# Patient Record
Sex: FEMALE | Race: WHITE
[De-identification: names, ages, dates, MRNs, and addresses within clinical notes are randomized per-mention and may not be internally consistent; named-entity substitution may affect disease eponyms.]

---

## 2019-03-28 ENCOUNTER — HOSPITAL ENCOUNTER (INPATIENT)
Dept: HOSPITAL 10 - E/R | Age: 59
LOS: 3 days | DRG: 871 | End: 2019-03-31
Attending: INTERNAL MEDICINE | Admitting: INTERNAL MEDICINE
Payer: COMMERCIAL

## 2019-03-28 ENCOUNTER — HOSPITAL ENCOUNTER (INPATIENT)
Dept: HOSPITAL 91 - 6WM | Age: 59
LOS: 3 days | DRG: 871 | End: 2019-03-31
Payer: COMMERCIAL

## 2019-03-28 VITALS
HEIGHT: 60 IN | BODY MASS INDEX: 57.52 KG/M2 | WEIGHT: 293 LBS | HEIGHT: 60 IN | BODY MASS INDEX: 57.52 KG/M2 | WEIGHT: 293 LBS

## 2019-03-28 DIAGNOSIS — Z66: ICD-10-CM

## 2019-03-28 DIAGNOSIS — K74.60: ICD-10-CM

## 2019-03-28 DIAGNOSIS — N18.6: ICD-10-CM

## 2019-03-28 DIAGNOSIS — B37.49: ICD-10-CM

## 2019-03-28 DIAGNOSIS — I13.2: ICD-10-CM

## 2019-03-28 DIAGNOSIS — A41.9: Primary | ICD-10-CM

## 2019-03-28 DIAGNOSIS — J69.0: ICD-10-CM

## 2019-03-28 DIAGNOSIS — D61.818: ICD-10-CM

## 2019-03-28 DIAGNOSIS — G47.33: ICD-10-CM

## 2019-03-28 DIAGNOSIS — N17.9: ICD-10-CM

## 2019-03-28 DIAGNOSIS — E78.2: ICD-10-CM

## 2019-03-28 DIAGNOSIS — K72.90: ICD-10-CM

## 2019-03-28 DIAGNOSIS — N25.81: ICD-10-CM

## 2019-03-28 DIAGNOSIS — I73.9: ICD-10-CM

## 2019-03-28 DIAGNOSIS — D68.4: ICD-10-CM

## 2019-03-28 DIAGNOSIS — I50.33: ICD-10-CM

## 2019-03-28 DIAGNOSIS — E87.2: ICD-10-CM

## 2019-03-28 DIAGNOSIS — K75.81: ICD-10-CM

## 2019-03-28 DIAGNOSIS — Z89.612: ICD-10-CM

## 2019-03-28 DIAGNOSIS — E66.01: ICD-10-CM

## 2019-03-28 LAB
ABNORMAL IP MESSAGE: 1
ADD MAN DIFF?: NO
ADD UMIC: YES
ALANINE AMINOTRANSFERASE: 54 IU/L (ref 13–69)
ALBUMIN/GLOBULIN RATIO: 0.7
ALBUMIN: 2.2 G/DL (ref 3.3–4.9)
ALKALINE PHOSPHATASE: 223 IU/L (ref 42–121)
ANION GAP: 16 (ref 5–13)
ASPARTATE AMINO TRANSFERASE: 61 IU/L (ref 15–46)
BASOPHIL #: 0 10^3/UL (ref 0–0.1)
BASOPHILS %: 0.3 % (ref 0–2)
BILIRUBIN,DIRECT: 0.9 MG/DL (ref 0–0.2)
BILIRUBIN,TOTAL: 2.7 MG/DL (ref 0.2–1.3)
BLOOD UREA NITROGEN: 128 MG/DL (ref 7–20)
CALCIUM: 7.9 MG/DL (ref 8.4–10.2)
CARBON DIOXIDE: 25 MMOL/L (ref 21–31)
CHLORIDE: 100 MMOL/L (ref 97–110)
CREATININE: 5.02 MG/DL (ref 0.44–1)
EOSINOPHILS #: 0.1 10^3/UL (ref 0–0.5)
EOSINOPHILS %: 0.7 % (ref 0–7)
GLOBULIN: 3.1 G/DL (ref 1.3–3.2)
GLUCOSE: 96 MG/DL (ref 70–220)
HEMATOCRIT: 22 % (ref 37–47)
HEMOGLOBIN: 7 G/DL (ref 12–16)
IMMATURE GRANS #M: 0.08 10^3/UL (ref 0–0.03)
IMMATURE GRANS % (M): 1.2 % (ref 0–0.43)
INR: 1.53
LIPASE: 43 U/L (ref 23–300)
LYMPHOCYTES #: 1 10^3/UL (ref 0.8–2.9)
LYMPHOCYTES %: 14.2 % (ref 15–51)
MEAN CORPUSCULAR HEMOGLOBIN: 31.8 PG (ref 29–33)
MEAN CORPUSCULAR HGB CONC: 31.8 G/DL (ref 32–37)
MEAN CORPUSCULAR VOLUME: 100 FL (ref 82–101)
MEAN PLATELET VOLUME: 10.7 FL (ref 7.4–10.4)
MONOCYTE #: 0.2 10^3/UL (ref 0.3–0.9)
MONOCYTES %: 2.4 % (ref 0–11)
NEUTROPHIL #: 5.5 10^3/UL (ref 1.6–7.5)
NEUTROPHILS %: 81.2 % (ref 39–77)
NUCLEATED RED BLOOD CELLS #: 0 10^3/UL (ref 0–0)
NUCLEATED RED BLOOD CELLS%: 0.4 /100WBC (ref 0–0)
PARTIAL THROMBOPLASTIN TIME: 46.8 SEC (ref 23–35)
PLATELET COUNT: 73 10^3/UL (ref 140–415)
POSITIVE DIFF: (no result)
POTASSIUM: 4.5 MMOL/L (ref 3.5–5.1)
PROTIME: 18.5 SEC (ref 11.9–14.9)
PT RATIO: 1.4
RED BLOOD COUNT: 2.2 10^6/UL (ref 4.2–5.4)
RED CELL DISTRIBUTION WIDTH: 20.6 % (ref 11.5–14.5)
SODIUM: 141 MMOL/L (ref 135–144)
TOTAL PROTEIN: 5.3 G/DL (ref 6.1–8.1)
TROPONIN-I: < 0.012 NG/ML (ref 0–0.12)
UR ASCORBIC ACID: NEGATIVE MG/DL
UR BACTERIA: (no result) /HPF
UR BILIRUBIN (DIP): NEGATIVE MG/DL
UR BLOOD (DIP): (no result) MG/DL
UR BUDDING YEAST: (no result) /HPF
UR CLARITY: (no result)
UR COLOR: (no result)
UR GLUCOSE (DIP): NEGATIVE MG/DL
UR KETONES (DIP): NEGATIVE MG/DL
UR LEUKOCYTE ESTERASE (DIP): (no result) LEU/UL
UR MUCUS: (no result) /HPF
UR NITRITE (DIP): NEGATIVE MG/DL
UR PH (DIP): 5 (ref 5–9)
UR RBC: 22 /HPF (ref 0–5)
UR SPECIFIC GRAVITY (DIP): 1.01 (ref 1–1.03)
UR TOTAL PROTEIN (DIP): (no result) MG/DL
UR UROBILINOGEN (DIP): NEGATIVE MG/DL
UR WBC: > 182 /HPF (ref 0–5)
WHITE BLOOD COUNT: 6.8 10^3/UL (ref 4.8–10.8)

## 2019-03-28 PROCEDURE — 83735 ASSAY OF MAGNESIUM: CPT

## 2019-03-28 PROCEDURE — 86900 BLOOD TYPING SEROLOGIC ABO: CPT

## 2019-03-28 PROCEDURE — 94664 DEMO&/EVAL PT USE INHALER: CPT

## 2019-03-28 PROCEDURE — 86901 BLOOD TYPING SEROLOGIC RH(D): CPT

## 2019-03-28 PROCEDURE — 82436 ASSAY OF URINE CHLORIDE: CPT

## 2019-03-28 PROCEDURE — 81001 URINALYSIS AUTO W/SCOPE: CPT

## 2019-03-28 PROCEDURE — C1769 GUIDE WIRE: HCPCS

## 2019-03-28 PROCEDURE — 86945 BLOOD PRODUCT/IRRADIATION: CPT

## 2019-03-28 PROCEDURE — 76775 US EXAM ABDO BACK WALL LIM: CPT

## 2019-03-28 PROCEDURE — 71250 CT THORAX DX C-: CPT

## 2019-03-28 PROCEDURE — 82962 GLUCOSE BLOOD TEST: CPT

## 2019-03-28 PROCEDURE — 82803 BLOOD GASES ANY COMBINATION: CPT

## 2019-03-28 PROCEDURE — 87340 HEPATITIS B SURFACE AG IA: CPT

## 2019-03-28 PROCEDURE — 83036 HEMOGLOBIN GLYCOSYLATED A1C: CPT

## 2019-03-28 PROCEDURE — 82607 VITAMIN B-12: CPT

## 2019-03-28 PROCEDURE — 83605 ASSAY OF LACTIC ACID: CPT

## 2019-03-28 PROCEDURE — 84443 ASSAY THYROID STIM HORMONE: CPT

## 2019-03-28 PROCEDURE — 83690 ASSAY OF LIPASE: CPT

## 2019-03-28 PROCEDURE — 86706 HEP B SURFACE ANTIBODY: CPT

## 2019-03-28 PROCEDURE — 90686 IIV4 VACC NO PRSV 0.5 ML IM: CPT

## 2019-03-28 PROCEDURE — 90935 HEMODIALYSIS ONE EVALUATION: CPT

## 2019-03-28 PROCEDURE — 84155 ASSAY OF PROTEIN SERUM: CPT

## 2019-03-28 PROCEDURE — 70450 CT HEAD/BRAIN W/O DYE: CPT

## 2019-03-28 PROCEDURE — 36430 TRANSFUSION BLD/BLD COMPNT: CPT

## 2019-03-28 PROCEDURE — 84300 ASSAY OF URINE SODIUM: CPT

## 2019-03-28 PROCEDURE — 80053 COMPREHEN METABOLIC PANEL: CPT

## 2019-03-28 PROCEDURE — C9113 INJ PANTOPRAZOLE SODIUM, VIA: HCPCS

## 2019-03-28 PROCEDURE — 36569 INSJ PICC 5 YR+ W/O IMAGING: CPT

## 2019-03-28 PROCEDURE — 99291 CRITICAL CARE FIRST HOUR: CPT

## 2019-03-28 PROCEDURE — 87081 CULTURE SCREEN ONLY: CPT

## 2019-03-28 PROCEDURE — 83540 ASSAY OF IRON: CPT

## 2019-03-28 PROCEDURE — P9016 RBC LEUKOCYTES REDUCED: HCPCS

## 2019-03-28 PROCEDURE — 85384 FIBRINOGEN ACTIVITY: CPT

## 2019-03-28 PROCEDURE — 86850 RBC ANTIBODY SCREEN: CPT

## 2019-03-28 PROCEDURE — 74176 CT ABD & PELVIS W/O CONTRAST: CPT

## 2019-03-28 PROCEDURE — 85730 THROMBOPLASTIN TIME PARTIAL: CPT

## 2019-03-28 PROCEDURE — 80061 LIPID PANEL: CPT

## 2019-03-28 PROCEDURE — 82728 ASSAY OF FERRITIN: CPT

## 2019-03-28 PROCEDURE — 85378 FIBRIN DEGRADE SEMIQUANT: CPT

## 2019-03-28 PROCEDURE — 87040 BLOOD CULTURE FOR BACTERIA: CPT

## 2019-03-28 PROCEDURE — 71045 X-RAY EXAM CHEST 1 VIEW: CPT

## 2019-03-28 PROCEDURE — 85362 FIBRIN DEGRADATION PRODUCTS: CPT

## 2019-03-28 PROCEDURE — 85670 THROMBIN TIME PLASMA: CPT

## 2019-03-28 PROCEDURE — 82140 ASSAY OF AMMONIA: CPT

## 2019-03-28 PROCEDURE — 86920 COMPATIBILITY TEST SPIN: CPT

## 2019-03-28 PROCEDURE — 82043 UR ALBUMIN QUANTITATIVE: CPT

## 2019-03-28 PROCEDURE — 85610 PROTHROMBIN TIME: CPT

## 2019-03-28 PROCEDURE — 81003 URINALYSIS AUTO W/O SCOPE: CPT

## 2019-03-28 PROCEDURE — 76705 ECHO EXAM OF ABDOMEN: CPT

## 2019-03-28 PROCEDURE — 84484 ASSAY OF TROPONIN QUANT: CPT

## 2019-03-28 PROCEDURE — 85049 AUTOMATED PLATELET COUNT: CPT

## 2019-03-28 PROCEDURE — C1752 CATH,HEMODIALYSIS,SHORT-TERM: HCPCS

## 2019-03-28 PROCEDURE — P9059 PLASMA, FRZ BETWEEN 8-24HOUR: HCPCS

## 2019-03-28 PROCEDURE — 86644 CMV ANTIBODY: CPT

## 2019-03-28 PROCEDURE — 82746 ASSAY OF FOLIC ACID SERUM: CPT

## 2019-03-28 PROCEDURE — 84133 ASSAY OF URINE POTASSIUM: CPT

## 2019-03-28 PROCEDURE — 85025 COMPLETE CBC W/AUTO DIFF WBC: CPT

## 2019-03-28 PROCEDURE — 36600 WITHDRAWAL OF ARTERIAL BLOOD: CPT

## 2019-03-28 PROCEDURE — P9047 ALBUMIN (HUMAN), 25%, 50ML: HCPCS

## 2019-03-28 PROCEDURE — 87086 URINE CULTURE/COLONY COUNT: CPT

## 2019-03-28 PROCEDURE — P9035 PLATELET PHERES LEUKOREDUCED: HCPCS

## 2019-03-28 RX ADMIN — FOLIC ACID SCH MLS/HR: 5 INJECTION, SOLUTION INTRAMUSCULAR; INTRAVENOUS; SUBCUTANEOUS at 23:48

## 2019-03-28 RX ADMIN — THIAMINE HYDROCHLORIDE 1 MLS/HR: 100 INJECTION, SOLUTION INTRAMUSCULAR; INTRAVENOUS at 23:48

## 2019-03-28 RX ADMIN — LIDOCAINE HYDROCHLORIDE 1 MLS/HR: 10 INJECTION, SOLUTION EPIDURAL; INFILTRATION; INTRACAUDAL; PERINEURAL at 23:09

## 2019-03-28 NOTE — ERD
ER Documentation


Chief Complaint


Chief Complaint





johann whitlock from [facility] for kidney failure as per pmd





HPI


Patient is a 58-year-old female with anemia, chronic kidney disease, and 


thrombocytopenia who presents for acute renal failure.  The patient was sent by 


a nurse practitioner at [facility] for acute on chronic kidney disease.


 Brought in by ambulance and came from a skilled nursing facility.  The patient 


has had a worsening creatinine of 5.84 which is usually 1.5.  The patient is not


on dialysis currently.  The patient is a DNR/DNI.  Please note the history and 


physical exam is limited secondary to patient's mental status.





ROS


All systems reviewed and are negative except as per history of present illness.





Allergies


Allergies:  


Coded Allergies:  


     No Known Allergy (Unverified , 3/28/19)





PMhx/Soc


Medical and Surgical Hx:  Unable to obtain


Hx Alcohol Use:  No


Hx Substance Use:  No


Hx Tobacco Use:  No


Smoking Status:  Never smoker





FmHx


Unable to obtain





Physical Exam


Vitals





Vital Signs


  Date      Temp  Pulse  Resp  B/P (MAP)   Pulse Ox  O2          O2 Flow    FiO2


Time                                                 Delivery    Rate


   3/28/19  98.7     89    19     145/131       100  Room Air


     22:46                          (136)


   3/28/19  98.7     72    19      141/65       100


     21:31                           (90)





Physical Exam


Const:   No acute distress


Head:   Atraumatic 


Eyes:    Normal Conjunctiva


ENT:    Normal External Ears, Nose and Mouth.


Neck:               Full range of motion. No meningismus.


Resp:   Clear to auscultation bilaterally


Cardio:   Regular rate and rhythm, no murmurs


Abd:    Soft, non tender, non distended. Normal bowel sounds


Skin:   No petechiae or rashes


Back:   No midline or flank tenderness


Ext:    No cyanosis, or edema


Neur:   Awake and alert


Psych:    Normal Mood and Affect


Result Diagram:  


3/28/19 2236                                                                    


           3/28/19 2236





Results 24 hrs





Laboratory Tests


      Test
                                  3/28/19
22:35   3/28/19
22:36


      Urine Color                          GINGER


      Urine Clarity                        CLOUDY


      Urine pH                                        5.0


      Urine Specific Gravity                        1.015


      Urine Ketones                        NEGATIVE mg/dL


      Urine Nitrite                        NEGATIVE mg/dL


      Urine Bilirubin                      NEGATIVE mg/dL


      Urine Urobilinogen                   NEGATIVE mg/dL


      Urine Leukocyte Esterase                  3+ Torsten/ul


      Urine Microscopic RBC                       22 /HPF


      Urine Microscopic WBC                > 182 /HPF


      Urine Bacteria                       FEW /HPF


      Urine Mucus                          FEW /HPF


      Urine Yeast (Budding)                MANY /HPF


      Urine Hemoglobin                           2+ mg/dL


      Urine Glucose                        NEGATIVE mg/dL


      Urine Total Protein                        1+ mg/dl


      White Blood Count                                       6.8 10^3/ul


      Red Blood Count                                        2.20 10^6/ul


      Hemoglobin                                                 7.0 g/dl


      Hematocrit                                                   22.0 %


      Mean Corpuscular Volume                                    100.0 fl


      Mean Corpuscular Hemoglobin                                 31.8 pg


      Mean Corpuscular Hemoglobin
Concent  
                   31.8 g/dl 



      Red Cell Distribution Width                                  20.6 %


      Platelet Count                                           73 10^3/UL


      Mean Platelet Volume                                        10.7 fl


      Immature Granulocytes %                                     1.200 %


      Neutrophils %                                                81.2 %


      Lymphocytes %                                                14.2 %


      Monocytes %                                                   2.4 %


      Eosinophils %                                                 0.7 %


      Basophils %                                                   0.3 %


      Nucleated Red Blood Cells %                             0.4 /100WBC


      Immature Granulocytes #                               0.080 10^3/ul


      Neutrophils #                                           5.5 10^3/ul


      Lymphocytes #                                           1.0 10^3/ul


      Monocytes #                                             0.2 10^3/ul


      Eosinophils #                                           0.1 10^3/ul


      Basophils #                                             0.0 10^3/ul


      Nucleated Red Blood Cells #                             0.0 10^3/ul


      Prothrombin Time                                           18.5 Sec


      Prothrombin Time Ratio                                          1.4


      INR International Normalized
Ratio   
                        1.53 



      Activated Partial
Thromboplast Time  
                    46.8 Sec 



      Sodium Level                                             141 mmol/L


      Potassium Level                                          4.5 mmol/L


      Chloride Level                                           100 mmol/L


      Carbon Dioxide Level                                      25 mmol/L


      Anion Gap                                                        16


      Blood Urea Nitrogen                                       128 mg/dl


      Creatinine                                               5.02 mg/dl


      Est Glomerular Filtrat Rate
mL/min   
                    9 mL/min 



      Glucose Level                                              96 mg/dl


      Calcium Level                                             7.9 mg/dl


      Total Bilirubin                                           2.7 mg/dl


      Direct Bilirubin                                         0.90 mg/dl


      Indirect Bilirubin                                        1.8 mg/dl


      Aspartate Amino Transf
(AST/SGOT)    
                     61 IU/L 



      Alanine Aminotransferase
(ALT/SGPT)  
                     54 IU/L 



      Alkaline Phosphatase                                       223 IU/L


      Troponin I                                            < 0.012 ng/ml


      Total Protein                                              5.3 g/dl


      Albumin                                                    2.2 g/dl


      Globulin                                                  3.10 g/dl


      Albumin/Globulin Ratio                                         0.70


      Lipase                                                       43 U/L





Current Medications


 Medications
   Dose
          Sig/Javier
       Start Time
   Status  Last


 (Trade)       Ordered        Route
 PRN     Stop Time              Admin
Dose


                              Reason                                Admin


 Ceftriaxone    50 ml @ 
      ONCE  ONCE
    3/28/19                



Sodium         100 mls/hr     IVPB
          23:30



                                             3/28/19 23:59


 Sodium         0 ml @ 0
      Q0M ONCE
      3/28/19       DC       



Chloride       mls/hr         IV
            23:09



                                             3/28/19 23:12


 Ondansetron    4 mg           BRIDGE ORDER   3/28/19                



HCl
  (Zofran                 PRN
 IV
       23:30



Inj)                          NAUSEA/VOMITI  3/29/19 23:29


                              NG


                650 mg         ER BRIDGE      3/28/19                



Acetaminophen                 PRN
 PO
       23:30




  (Tylenol                   .MILD PAIN     3/29/19 23:29


Tab)                          1-3 OR TEMP








Procedures/MDM


Patient is a 58-year-old female who presents with acute renal failure.  She also


was found to have anemia and cystitis.  At this point out sepsis as she has no 


sirs criteria.  The patient will be given ceftriaxone and cultures are pending. 


The patient will be given 2 units of packed red blood cells for hemoglobin of 


7.0 which was requested by the nurse practitioner.  The patient will be admitted


to the care of Dr. Mclain.  Prognosis is poor given her comorbidities.





Critical Care:


   Time:                                 35 minutes excluding all billable 


procedures.


   Treatments/Evaluations:      Close monitoring and treatment of unstable vital


signs, cardiorespiratory, and neurologic status, while maintaining tight balance


of fluid, respiratory, and cardiac interventions.





Departure


Diagnosis:  


   Primary Impression:  


   Cystitis


   Additional Impressions:  


   ARF (acute renal failure)


   Acute renal failure type:  unspecified  Qualified Codes:  N17.9 - Acute 


   kidney failure, unspecified


   Anemia


   Anemia type:  unspecified type  Qualified Codes:  D64.9 - Anemia, unspecified


Condition:  Serious











NENA BARRIENTOS MD                Mar 28, 2019 23:54

## 2019-03-28 NOTE — HP
Date/Time of Note


Date/Time of Note


DATE: 3/28/19 


TIME: 23:58





Assessment/Plan


VTE Prophylaxis


Pharmacological prophylaxis:  heparin





Lines/Catheters


IV Catheter Type (from Nrs):  Saline Lock





Assessment/Plan


Assessment/Plan





1.  Acute on CKD


-will hydrate


-Place a Quintanilla, urine electrolytes, renal ultrasound, nephrology consult





2.  Anemia, likely acute on chronic: Reportedly patient does have a history of 


anemia.  No reported GI bleed


-FOBT, ferritin/iron


-Need for Epogen per nephrology


-Consider GI consult, which can wait until the results of FOBT is available 


unless anemia acutely worsens.  





3.  Sepsis: Secondary to UTI and skin wounds


-IV antibiotic, IV fluids.  Follow-up culture results.  Wound care consult





4. Lactic acidosis: Trend lactate.  See #3





5.  Chronic encephalopathy: Supportive care





6.  ALMA DELIA: Supplemental oxygen, bronchodilators.  Positive pressure ventilation as


needed 





7.  Hypertension: BP currently was in acceptable range





8.  Morbid obesity with a BMI of 65





9.  Multiple skin wounds: Wound care consult.  IV antibiotic, which also 


addresses UTI


Result Diagram:  


3/28/19 2236                                                                    


           3/28/19 2236





Results 24hrs





Laboratory Tests


       Test
                                3/28/19
22:35  3/28/19
22:36


       Urine Color                          GINGER


       Urine Clarity                        CLOUDY  A


       Urine pH                                     5.0


       Urine Specific Gravity                     1.015


       Urine Ketones                        NEGATIVE


       Urine Nitrite                        NEGATIVE


       Urine Bilirubin                      NEGATIVE


       Urine Urobilinogen                   NEGATIVE


       Urine Leukocyte Esterase                     3+  H


       Urine Microscopic RBC                        22  H


       Urine Microscopic WBC                > 182  H


       Urine Bacteria                       FEW  A


       Urine Mucus                          FEW  A


       Urine Yeast (Budding)                MANY  A


       Urine Hemoglobin                             2+  H


       Urine Glucose                        NEGATIVE


       Urine Total Protein                          1+  H


       White Blood Count                                           6.8


       Red Blood Count                                           2.20  L


       Hemoglobin                                                 7.0  L


       Hematocrit                                                22.0  L


       Mean Corpuscular Volume                                   100.0


       Mean Corpuscular Hemoglobin                                31.8


       Mean Corpuscular Hemoglobin
Concent  
                   31.8  L



       Red Cell Distribution Width                               20.6  H


       Platelet Count                                              73  L


       Mean Platelet Volume                                      10.7  H


       Immature Granulocytes %                                  1.200  H


       Neutrophils %                                             81.2  H


       Lymphocytes %                                             14.2  L


       Monocytes %                                                 2.4


       Eosinophils %                                               0.7


       Basophils %                                                 0.3


       Nucleated Red Blood Cells %                                0.4  H


       Immature Granulocytes #                                  0.080  H


       Neutrophils #                                               5.5


       Lymphocytes #                                               1.0


       Monocytes #                                                0.2  L


       Eosinophils #                                               0.1


       Basophils #                                                 0.0


       Nucleated Red Blood Cells #                                 0.0


       Prothrombin Time                                          18.5  H


       Prothrombin Time Ratio                                      1.4


       INR International Normalized
Ratio   
                    1.53  



       Activated Partial
Thromboplast Time  
                   46.8  H



       Sodium Level                                                141


       Potassium Level                                             4.5


       Chloride Level                                              100


       Carbon Dioxide Level                                         25


       Anion Gap                                                   16  H


       Blood Urea Nitrogen                                        128  H


       Creatinine                                                5.02  H


       Est Glomerular Filtrat Rate
mL/min   
                      9  L



       Glucose Level                                                96


       Calcium Level                                              7.9  L


       Total Bilirubin                                            2.7  H


       Direct Bilirubin                                          0.90  H


       Indirect Bilirubin                                         1.8  H


       Aspartate Amino Transf
(AST/SGOT)    
                     61  H



       Alanine Aminotransferase
(ALT/SGPT)  
                      54  



       Alkaline Phosphatase                                       223  H


       Troponin I                                          < 0.012


       Total Protein                                              5.3  L


       Albumin                                                    2.2  L


       Globulin                                                   3.10


       Albumin/Globulin Ratio                                     0.70


       Lipase                                                       43








HPI/ROS


Admit Date/Time


Admit Date/Time








Hx of Present Illness





This is a 58-year-old morbidly obese female with encephalopathy, hypertension, 


ALMA DELIA, CKD, PVD, diastolic CHF, left AKA, multiple skin wounds, history of I&D and


bilateral lower extremity.  Patient was sent from [facility] for worsening 


kidney function and anemia.  Information is gathered from chart review and from 


the ER physician because of patient's mental status.  Reportedly, patient is 


A&Ox1 at baseline.


When presented to ER, vitals were stable.  Lab shows creatinine of 5, , 


lactic acid 2.7 which increased to 3.1, hemoglobin 7.  UA consistent with UTI.  


Chest x-ray shows atelectasis otherwise no acute findings.





PMH/Family/Social


Past Medical History


Medical History:  other (See HPI)


Medications





Current Medications


Ceftriaxone Sodium 50 ml @  100 mls/hr ONCE  ONCE IVPB ;  Start 3/28/19 at 


23:30;  Stop 3/28/19 at 23:59


Ondansetron HCl (Zofran Inj) 4 mg BRIDGE ORDER PRN IV NAUSEA/VOMITING;  Start 


3/28/19 at 23:30;  Stop 3/29/19 at 23:29


Acetaminophen (Tylenol Tab) 650 mg ER BRIDGE PRN PO .MILD PAIN 1-3 OR TEMP;  


Start 3/28/19 at 23:30;  Stop 3/29/19 at 23:29


Sodium Chloride 1,000 ml @  100 mls/hr Q10H IV ;  Start 3/28/19 at 23:48


IV Flush (NS 3 ml) 3 ml PER PROTOCOL IV ;  Start 3/29/19 at 00:00


Ondansetron HCl (Zofran Inj) 4 mg Q6H  PRN IV NAUSEA/VOMITING;  Start 3/29/19 at


00:00


Acetaminophen (Tylenol Tab) 650 mg Q6H  PRN PO .PAIN 1-3 OR TEMP;  Start 3/29/19


at 00:00


Heparin Sodium (Porcine) (Heparin  (5000 Units/1ml)) 5,000 unit Q12 SC ;  Start 


3/29/19 at 09:00


Albuterol/ Ipratropium (Duoneb) 3 ml Q2H RESP THERAPY  PRN HHN SHORTNESS OF 


BREATH;  Start 3/29/19 at 00:00


Coded Allergies:  


     No Known Allergy (Unverified , 3/28/19)





Past Surgical History


Past Surgical Hx:  other (See HPI)





Family History


Significant Family History:  other (Unknown)





Social History


Alcohol Use:  other (Unknown)


Smoking Status:  Unknown if ever smoked


Drug Use:  other (Unknown)





Exam/Review of Systems


Vital Signs


Vitals





Vital Signs


  Date      Temp  Pulse  Resp  B/P (MAP)   Pulse Ox  O2          O2 Flow    FiO2


Time                                                 Delivery    Rate


   3/28/19  98.7     89    19     145/131       100  Room Air


     22:46                          (136)








Exam


Constitutional:  other (No acute distress.  Patient is not fully oriented)


Eyes:  PERRL


Respiratory:  other (Decreased breath sounds at the base bilaterally)


Cardiovascular:  regular rate and rhythm


Gastrointestinal:  soft


Extremities:  normal pulses


Neurological:  other (Not fully oriented)











SRIDEVI YI MD                 Mar 28, 2019 23:58

## 2019-03-29 VITALS — DIASTOLIC BLOOD PRESSURE: 90 MMHG | RESPIRATION RATE: 11 BRPM | HEART RATE: 86 BPM | SYSTOLIC BLOOD PRESSURE: 131 MMHG

## 2019-03-29 VITALS — DIASTOLIC BLOOD PRESSURE: 48 MMHG | RESPIRATION RATE: 17 BRPM | HEART RATE: 87 BPM | SYSTOLIC BLOOD PRESSURE: 104 MMHG

## 2019-03-29 VITALS — DIASTOLIC BLOOD PRESSURE: 57 MMHG | HEART RATE: 92 BPM | SYSTOLIC BLOOD PRESSURE: 113 MMHG | RESPIRATION RATE: 18 BRPM

## 2019-03-29 VITALS — DIASTOLIC BLOOD PRESSURE: 61 MMHG | RESPIRATION RATE: 10 BRPM | HEART RATE: 89 BPM | SYSTOLIC BLOOD PRESSURE: 129 MMHG

## 2019-03-29 VITALS — DIASTOLIC BLOOD PRESSURE: 59 MMHG | RESPIRATION RATE: 17 BRPM | HEART RATE: 87 BPM | SYSTOLIC BLOOD PRESSURE: 105 MMHG

## 2019-03-29 VITALS — SYSTOLIC BLOOD PRESSURE: 97 MMHG | RESPIRATION RATE: 18 BRPM | DIASTOLIC BLOOD PRESSURE: 56 MMHG | HEART RATE: 73 BPM

## 2019-03-29 VITALS — RESPIRATION RATE: 18 BRPM | DIASTOLIC BLOOD PRESSURE: 55 MMHG | SYSTOLIC BLOOD PRESSURE: 113 MMHG | HEART RATE: 89 BPM

## 2019-03-29 VITALS — HEART RATE: 92 BPM

## 2019-03-29 VITALS — HEART RATE: 92 BPM | SYSTOLIC BLOOD PRESSURE: 131 MMHG | DIASTOLIC BLOOD PRESSURE: 82 MMHG

## 2019-03-29 VITALS — DIASTOLIC BLOOD PRESSURE: 102 MMHG | RESPIRATION RATE: 11 BRPM | HEART RATE: 86 BPM | SYSTOLIC BLOOD PRESSURE: 118 MMHG

## 2019-03-29 VITALS — HEART RATE: 98 BPM

## 2019-03-29 VITALS — HEART RATE: 81 BPM

## 2019-03-29 VITALS — HEART RATE: 90 BPM

## 2019-03-29 LAB
% IRON SATURATION: 51 % SAT (ref 22–52)
ABNORMAL IP MESSAGE: 1
ADD MAN DIFF?: NO
ALANINE AMINOTRANSFERASE: 53 IU/L (ref 13–69)
ALBUMIN/GLOBULIN RATIO: 0.8
ALBUMIN: 2.4 G/DL (ref 3.3–4.9)
ALKALINE PHOSPHATASE: 201 IU/L (ref 42–121)
ALLEN TEST: (no result)
AMMONIA: 92 UMOL/L (ref 9–30)
ANION GAP: 16 (ref 5–13)
ANISOCYTOSIS: (no result) (ref 0–0)
ARTERIAL BASE EXCESS: -0.4 MMOL/L (ref -3–3)
ARTERIAL BLOOD GAS OXYGEN SAT: 93.6 MMHG (ref 95–98)
ARTERIAL COHB: 0.1 % (ref 0–3)
ARTERIAL FRACTION OF OXYHGB: 93 % (ref 93–99)
ARTERIAL HCO3: 22.7 MMOL/L (ref 22–26)
ARTERIAL METHB: 0.5 % (ref 0–1.5)
ARTERIAL PCO2: 31.3 MMHG (ref 35–45)
ASPARTATE AMINO TRANSFERASE: 51 IU/L (ref 15–46)
BAND NEUTROPHILS #M: 0.8 10^3/UL (ref 0–0.6)
BAND NEUTROPHILS % (M): 12 % (ref 0–4)
BASOPHIL #: 0 10^3/UL (ref 0–0.1)
BASOPHILS %: 0.3 % (ref 0–2)
BILIRUBIN,DIRECT: 1 MG/DL (ref 0–0.2)
BILIRUBIN,TOTAL: 2.6 MG/DL (ref 0.2–1.3)
BLOOD UREA NITROGEN: 128 MG/DL (ref 7–20)
CALCIUM: 7.5 MG/DL (ref 8.4–10.2)
CARBON DIOXIDE: 23 MMOL/L (ref 21–31)
CHLORIDE: 101 MMOL/L (ref 97–110)
CHOL/HDL RATIO: 7.3 RATIO
CHOLESTEROL: 95 MG/DL (ref 100–200)
CREATININE: 5.25 MG/DL (ref 0.44–1)
D-DIMER: 3371.23 NG/ML (ref ?–460)
EOSINOPHILS #: 0.1 10^3/UL (ref 0–0.5)
EOSINOPHILS % (M): 4 % (ref 0–7)
EOSINOPHILS %: 2 % (ref 0–7)
FERRITIN: 415 NG/ML (ref 11.1–264)
FIBRIN SPLIT PRODUCT: (no result) UG/ML (ref ?–10)
FIBRINOGEN: 218 MG/DL (ref 207–461)
FIO2: 21 %
FOLATE: 10.1 NG/ML (ref 2.8–20)
GLOBULIN: 3 G/DL (ref 1.3–3.2)
GLUCOSE: 82 MG/DL (ref 70–220)
HDL CHOLESTEROL: 13 MG/DL (ref 37–92)
HEMATOCRIT: 26.3 % (ref 37–47)
HEMOGLOBIN A1C: 4.9 % (ref 0–5.9)
HEMOGLOBIN: 8.6 G/DL (ref 12–16)
HEPATITIS B SURFACE ANTIBODY: NEGATIVE
HEPATITIS B SURFACE ANTIGEN: NEGATIVE
IMMATURE GRANS #M: 0.13 10^3/UL (ref 0–0.03)
IMMATURE GRANS % (M): 1.9 % (ref 0–0.43)
IMMEDIATE SPIN CROSSMATCH: 1 3
INR: 1.7
IRON: 74 UG/DL (ref 35–150)
LACTIC ACID: 2.7 MMOL/L (ref 0.5–2)
LACTIC ACID: 2.8 MMOL/L (ref 0.5–2)
LACTIC ACID: 3.1 MMOL/L (ref 0.5–2)
LDL CHOLESTEROL,CALCULATED: 64 MG/DL
LYMPHOCYTES #: 1.1 10^3/UL (ref 0.8–2.9)
LYMPHOCYTES #M: 1.3 10^3/UL (ref 0.8–2.9)
LYMPHOCYTES % (M): 19 % (ref 15–51)
LYMPHOCYTES %: 15 % (ref 15–51)
MACROCYTOSIS: (no result) (ref 0–0)
MAGNESIUM: 2.2 MG/DL (ref 1.7–2.5)
MEAN CORPUSCULAR HEMOGLOBIN: 30.5 PG (ref 29–33)
MEAN CORPUSCULAR HGB CONC: 32.7 G/DL (ref 32–37)
MEAN CORPUSCULAR VOLUME: 93.3 FL (ref 82–101)
MEAN PLATELET VOLUME: 10.7 FL (ref 7.4–10.4)
METAMYELOCYTES #M: 0 10^3/UL (ref 0–0)
METAMYELOCYTES %M: 1 % (ref 0–0)
MODE: (no result)
MONOCYTE #: 0.2 10^3/UL (ref 0.3–0.9)
MONOCYTE #M: 0.2 10^3/UL (ref 0.3–0.9)
MONOCYTES % (M): 3 % (ref 0–11)
MONOCYTES %: 3.3 % (ref 0–11)
NEUTROPHIL #: 5.5 10^3/UL (ref 1.6–7.5)
NEUTROPHILS %: 77.5 % (ref 39–77)
NUCLEATED RED BLOOD CELLS #: 0 10^3/UL (ref 0–0)
NUCLEATED RED BLOOD CELLS%: 0.6 /100WBC (ref 0–0)
O2 A-A PPRESDIFF RESPIRATORY: 37.4 MMHG (ref 7–24)
PARTIAL THROMBOPLASTIN TIME: 45.1 SEC (ref 23–35)
PLATELET COUNT: 63 10^3/UL (ref 140–415)
PLATELET COUNT: 75 10^3/UL (ref 140–415)
PLATELET ESTIMATE: (no result)
POIKILOCYTOSIS: (no result) (ref 0–0)
POLYCHROMASIA: (no result) (ref 0–0)
POSITIVE DIFF: (no result)
POTASSIUM: 4.4 MMOL/L (ref 3.5–5.1)
PROTIME: 20.1 SEC (ref 11.9–14.9)
PT RATIO: 1.6
RED BLOOD COUNT: 2.82 10^6/UL (ref 4.2–5.4)
RED CELL DISTRIBUTION WIDTH: 21.2 % (ref 11.5–14.5)
SEG NEUT #M: 4.3 10^3/UL (ref 1.6–7.5)
SEGMENTED NEUTROPHILS (M) %: 60 % (ref 39–77)
SMUDGE%M: 6 % (ref 0–0)
SODIUM: 140 MMOL/L (ref 135–144)
THROMBIN TIME: 18.6 SEC (ref 13.8–19.1)
THYROID STIMULATING HORMONE: 2.29 MIU/L (ref 0.47–4.68)
TOTAL IRON BINDING CAPACITY: 144 UG/DL (ref 241–421)
TOTAL PROTEIN: 5.4 G/DL (ref 6.1–8.1)
TRIGLYCERIDES: 92 MG/DL (ref 0–149)
VITAMIN B12: > 1000 PG/ML (ref 239–931)
WHITE BLOOD COUNT: 7 10^3/UL (ref 4.8–10.8)

## 2019-03-29 PROCEDURE — 02HV33Z INSERTION OF INFUSION DEVICE INTO SUPERIOR VENA CAVA, PERCUTANEOUS APPROACH: ICD-10-PCS

## 2019-03-29 PROCEDURE — 30243R1 TRANSFUSION OF NONAUTOLOGOUS PLATELETS INTO CENTRAL VEIN, PERCUTANEOUS APPROACH: ICD-10-PCS

## 2019-03-29 PROCEDURE — 30243N1 TRANSFUSION OF NONAUTOLOGOUS RED BLOOD CELLS INTO CENTRAL VEIN, PERCUTANEOUS APPROACH: ICD-10-PCS

## 2019-03-29 PROCEDURE — 30243N1 TRANSFUSION OF NONAUTOLOGOUS RED BLOOD CELLS INTO CENTRAL VEIN, PERCUTANEOUS APPROACH: ICD-10-PCS | Performed by: FAMILY MEDICINE

## 2019-03-29 PROCEDURE — 02HV33Z INSERTION OF INFUSION DEVICE INTO SUPERIOR VENA CAVA, PERCUTANEOUS APPROACH: ICD-10-PCS | Performed by: THORACIC SURGERY (CARDIOTHORACIC VASCULAR SURGERY)

## 2019-03-29 PROCEDURE — 4A133R1 MONITORING OF ARTERIAL SATURATION, PERIPHERAL, PERCUTANEOUS APPROACH: ICD-10-PCS

## 2019-03-29 PROCEDURE — 30243R1 TRANSFUSION OF NONAUTOLOGOUS PLATELETS INTO CENTRAL VEIN, PERCUTANEOUS APPROACH: ICD-10-PCS | Performed by: FAMILY MEDICINE

## 2019-03-29 PROCEDURE — 30243K1 TRANSFUSION OF NONAUTOLOGOUS FROZEN PLASMA INTO CENTRAL VEIN, PERCUTANEOUS APPROACH: ICD-10-PCS

## 2019-03-29 PROCEDURE — 30243K1 TRANSFUSION OF NONAUTOLOGOUS FROZEN PLASMA INTO CENTRAL VEIN, PERCUTANEOUS APPROACH: ICD-10-PCS | Performed by: FAMILY MEDICINE

## 2019-03-29 RX ADMIN — CEFTRIAXONE 1 MLS/HR: 1 INJECTION, SOLUTION INTRAVENOUS at 08:33

## 2019-03-29 RX ADMIN — PHYTONADIONE 1 MLS/HR: 10 INJECTION, EMULSION INTRAMUSCULAR; INTRAVENOUS; SUBCUTANEOUS at 14:12

## 2019-03-29 RX ADMIN — LIDOCAINE HYDROCHLORIDE 1 ML: 10 INJECTION, SOLUTION EPIDURAL; INFILTRATION; INTRACAUDAL; PERINEURAL at 15:39

## 2019-03-29 RX ADMIN — FLUCONAZOLE, SODIUM CHLORIDE SCH MLS/HR: 2 INJECTION INTRAVENOUS at 14:30

## 2019-03-29 RX ADMIN — NYSTATIN 1 APPLIC: 100000 POWDER TOPICAL at 22:05

## 2019-03-29 RX ADMIN — FLUCONAZOLE, SODIUM CHLORIDE SCH MLS/HR: 2 INJECTION INTRAVENOUS at 14:57

## 2019-03-29 RX ADMIN — NYSTATIN SCH APPLIC: 100000 POWDER TOPICAL at 22:05

## 2019-03-29 RX ADMIN — CEFTRIAXONE 1 MLS/HR: 1 INJECTION, SOLUTION INTRAVENOUS at 00:00

## 2019-03-29 RX ADMIN — THIAMINE HYDROCHLORIDE 1 MLS/HR: 100 INJECTION, SOLUTION INTRAMUSCULAR; INTRAVENOUS at 09:48

## 2019-03-29 RX ADMIN — FLUCONAZOLE, SODIUM CHLORIDE 1 MLS/HR: 2 INJECTION INTRAVENOUS at 14:57

## 2019-03-29 RX ADMIN — ALBUMIN (HUMAN) 1 MLS/HR: 0.25 INJECTION, SOLUTION INTRAVENOUS at 14:28

## 2019-03-29 RX ADMIN — FLUCONAZOLE, SODIUM CHLORIDE 1 MLS/HR: 2 INJECTION INTRAVENOUS at 14:30

## 2019-03-29 RX ADMIN — VANCOMYCIN HYDROCHLORIDE 1 MLS/HR: 500 INJECTION, POWDER, LYOPHILIZED, FOR SOLUTION INTRAVENOUS at 15:58

## 2019-03-29 RX ADMIN — HYDROCODONE BITARTRATE AND ACETAMINOPHEN 1 TAB: 5; 325 TABLET ORAL at 00:19

## 2019-03-29 RX ADMIN — CEFTRIAXONE SCH MLS/HR: 1 INJECTION, SOLUTION INTRAVENOUS at 08:33

## 2019-03-29 RX ADMIN — ALBUMIN (HUMAN) 1 MLS/HR: 0.25 INJECTION, SOLUTION INTRAVENOUS at 21:08

## 2019-03-29 RX ADMIN — HEPARIN SODIUM 1 UNIT: 5000 INJECTION, SOLUTION INTRAVENOUS; SUBCUTANEOUS at 08:39

## 2019-03-29 RX ADMIN — ALBUMIN (HUMAN) SCH MLS/HR: 0.25 INJECTION, SOLUTION INTRAVENOUS at 21:08

## 2019-03-29 RX ADMIN — FOLIC ACID SCH MLS/HR: 5 INJECTION, SOLUTION INTRAMUSCULAR; INTRAVENOUS; SUBCUTANEOUS at 09:48

## 2019-03-29 RX ADMIN — ALBUMIN (HUMAN) SCH MLS/HR: 0.25 INJECTION, SOLUTION INTRAVENOUS at 14:28

## 2019-03-29 NOTE — PN
Date/Time of Note


Date/Time of Note


DATE: 3/29/19 


TIME: 11:51





Assessment/Plan


VTE Prophylaxis


Risk score (from Ns)>0 risk:  6


SCD applied (from Ns):  No


SCD contraindicated:  other


Pharmacological prophylaxis:  NA/contraindicated


Pharm contraindication:  bleeding, blood coag disorder, thrombocytopenia





Lines/Catheters


IV Catheter Type (from [facility]):  Peripheral IV


Urinary Cath still in place:  Yes


Reason Cath still needed:  other (indicate)





Assessment/Plan


Hospital Course


Subjective: obtunded, maintaining saturations, but with pursed breathing, 


unresponsive to deep sternal rub but spontaneously moving





Objective: 


Constitutional:  obtunded, maintaining saturations, but with pursed breathing, 


unresponsive to deep sternal rub 


Head:  atraumatic, normocephalic, blood oozing from the corners of her mouth, 


evidence of bleeding on her gums and teeth


Neck: Difficult exam, seems supple


Respiratory: Diminished ++


Cardiovascular:  regular rate and rhythm


Gastrointestinal: Soft, evidence of subcutaneous edema with skin thickening and 


nodularity, also pitting, distended bowel sounds


Extremities: Severe pitting edema in all extremities, ecchymosis noted more on 


the right upper extremity, status post left AKA 


Skin: Multiple skin wounds





assessment and plan: 


58-year-old female who was brought in from skilled nursing facility because of 


acute on chronic kidney disease.  Patient said to be a DO NOT RESUSCITATE DO NOT


INTUBATE.  Patient is completely obtunded at this time.





1.   Acute encephalopathy, uremic? to rule out other causes


   -Patient will need brain imaging as well


   -ABG ordered stat


2.   Severe renal failure


   -acute r/o CKD, .2/2 severe anemia 


3.   Severe anemia with thrombocytopenia 


   -ongoing mucosal bleeding 


   -coagulopathy noted, ?cause, liver disease?


   -?internal blood loss


4.    Coagulopathy with active mucosal bleeding orally


5.    Sepsis with lactic acidosis


6.    Chronic liver disease?


   -Transaminitis with hyperbilirubinemia


7.    Urinary tract infection with positive yeast


8.    Diffuse anasarca


9.    Morbid obesity


10.  PVD Status post left AKA


11.  Possible aspiration pneumonia based on chest x-ray


12.  Multiple skin wounds


13.  Reported secondary hyperparathyroidism


   -PTH levels?, defer mgt to Nephro 


14.  Obstructive sleep apnea 


15.  History of necrotizing fasciitis 


16.  History of tobacco abuse 





Plan: 


Continue blood transfusion, add transfusion of platelets, FFP and vitamin K for 


active bleeding


Continue empiric antibiotics


Continue gentle IV hydration


Trend lactic acid levels


CT brain, chest abdomen and pelvis without IV contrast


Add empiric antifungals


Trend coag profile as well as liver function test


Try to reach family to notify them of poor prognosis


Wound care consult for management of skin ulcers





Further intervention per clinical course


Care time greater than 1 hour


Overall prognosis poor


No heparin for now until coagulopathy is resolved, Continue Protonix IV.


DNR/DNI and selective treatment only.  See Polst in chart


Result Diagram:  


3/28/19 2236                                                                    


           3/28/19 2236





Results 24hrs





Laboratory Tests


Test
                 3/28/19
22:35  3/28/19
22:36  3/28/19
23:35  3/29/19
02:33


Urine Color           GINGER


Urine Clarity         CLOUDY  A


Urine pH                      5.0


Urine Specific              1.015


Gravity


Urine Ketones         NEGATIVE


Urine Nitrite         NEGATIVE


Urine Bilirubin       NEGATIVE


Urine Urobilinogen    NEGATIVE


Urine Leukocyte               3+  H


Esterase


Urine Microscopic             22  H


RBC


Urine Microscopic     > 182  H


WBC


Urine Bacteria        FEW  A


Urine Mucus           FEW  A


Urine Yeast           MANY  A


(Budding)


Urine Hemoglobin              2+  H


Urine Glucose         NEGATIVE


Urine Total Protein           1+  H


White Blood Count                            6.8


Red Blood Count                            2.20  L


Hemoglobin                                  7.0  L


Hematocrit                                 22.0  L


Mean Corpuscular                           100.0


Volume


Mean Corpuscular                            31.8


Hemoglobin


Mean Corpuscular      
                   31.8  L
  
              



Hemoglobin
Concent


Red Cell                                   20.6  H


Distribution Width


Platelet Count                               73  L


Mean Platelet Volume                       10.7  H


Immature                                  1.200  H


Granulocytes %


Neutrophils %                              81.2  H


Lymphocytes %                              14.2  L


Monocytes %                                  2.4


Eosinophils %                                0.7


Basophils %                                  0.3


Nucleated Red Blood                         0.4  H


Cells %


Immature                                  0.080  H


Granulocytes #


Neutrophils #                                5.5


Lymphocytes #                                1.0


Monocytes #                                 0.2  L


Eosinophils #                                0.1


Basophils #                                  0.0


Nucleated Red Blood                          0.0


Cells #


Prothrombin Time                           18.5  H


Prothrombin Time                             1.4


Ratio


INR International     
                    1.53  
  
              



Normalized
Ratio


Activated             
                   46.8  H
  
              



Partial
Thromboplast


Time


Sodium Level                                 141


Potassium Level                              4.5


Chloride Level                               100


Carbon Dioxide Level                          25


Anion Gap                                    16  H


Blood Urea Nitrogen                         128  H


Creatinine                                 5.02  H


Est Glomerular        
                      9  L
  
              



Filtrat Rate
mL/min


Glucose Level                                 96


Calcium Level                               7.9  L


Total Bilirubin                             2.7  H


Direct Bilirubin                           0.90  H


Indirect Bilirubin                          1.8  H


Aspartate Amino       
                     61  H
  
              



Transf
(AST/SGOT)


Alanine               
                      54  
  
              



Aminotransferase
(AL


T/SGPT)


Alkaline Phosphatase                        223  H


Troponin I                           < 0.012


Total Protein                               5.3  L


Albumin                                     2.2  L


Globulin                                    3.10


Albumin/Globulin                            0.70


Ratio


Lipase                                        43


Lactic Acid Level                                         2.7  *H        3.1  *H








Exam/Review of Systems


Exam


Vitals





Vital Signs


  Date      Temp  Pulse  Resp  B/P (MAP)   Pulse Ox  O2          O2 Flow    FiO2


Time                                                 Delivery    Rate


   3/29/19           81


     08:05


   3/29/19  98.4           17      105/59        99


     07:24                           (74)


   3/29/19                                           Nasal             2.0


     03:30                                           Cannula








Results


Results 24hrs





Laboratory Tests


Test
                 3/28/19
22:35  3/28/19
22:36  3/28/19
23:35  3/29/19
02:33


Urine Color           GINGER


Urine Clarity         CLOUDY  A


Urine pH                      5.0


Urine Specific              1.015


Gravity


Urine Ketones         NEGATIVE


Urine Nitrite         NEGATIVE


Urine Bilirubin       NEGATIVE


Urine Urobilinogen    NEGATIVE


Urine Leukocyte               3+  H


Esterase


Urine Microscopic             22  H


RBC


Urine Microscopic     > 182  H


WBC


Urine Bacteria        FEW  A


Urine Mucus           FEW  A


Urine Yeast           MANY  A


(Budding)


Urine Hemoglobin              2+  H


Urine Glucose         NEGATIVE


Urine Total Protein           1+  H


White Blood Count                            6.8


Red Blood Count                            2.20  L


Hemoglobin                                  7.0  L


Hematocrit                                 22.0  L


Mean Corpuscular                           100.0


Volume


Mean Corpuscular                            31.8


Hemoglobin


Mean Corpuscular      
                   31.8  L
  
              



Hemoglobin
Concent


Red Cell                                   20.6  H


Distribution Width


Platelet Count                               73  L


Mean Platelet Volume                       10.7  H


Immature                                  1.200  H


Granulocytes %


Neutrophils %                              81.2  H


Lymphocytes %                              14.2  L


Monocytes %                                  2.4


Eosinophils %                                0.7


Basophils %                                  0.3


Nucleated Red Blood                         0.4  H


Cells %


Immature                                  0.080  H


Granulocytes #


Neutrophils #                                5.5


Lymphocytes #                                1.0


Monocytes #                                 0.2  L


Eosinophils #                                0.1


Basophils #                                  0.0


Nucleated Red Blood                          0.0


Cells #


Prothrombin Time                           18.5  H


Prothrombin Time                             1.4


Ratio


INR International     
                    1.53  
  
              



Normalized
Ratio


Activated             
                   46.8  H
  
              



Partial
Thromboplast


Time


Sodium Level                                 141


Potassium Level                              4.5


Chloride Level                               100


Carbon Dioxide Level                          25


Anion Gap                                    16  H


Blood Urea Nitrogen                         128  H


Creatinine                                 5.02  H


Est Glomerular        
                      9  L
  
              



Filtrat Rate
mL/min


Glucose Level                                 96


Calcium Level                               7.9  L


Total Bilirubin                             2.7  H


Direct Bilirubin                           0.90  H


Indirect Bilirubin                          1.8  H


Aspartate Amino       
                     61  H
  
              



Transf
(AST/SGOT)


Alanine               
                      54  
  
              



Aminotransferase
(AL


T/SGPT)


Alkaline Phosphatase                        223  H


Troponin I                           < 0.012


Total Protein                               5.3  L


Albumin                                     2.2  L


Globulin                                    3.10


Albumin/Globulin                            0.70


Ratio


Lipase                                        43


Lactic Acid Level                                         2.7  *H        3.1  *H








Medications


Medication





Current Medications


Sodium Chloride 1,000 ml @  100 mls/hr Q10H IV ;  Start 3/28/19 at 23:48


IV Flush (NS 3 ml) 3 ml PER PROTOCOL IV ;  Start 3/29/19 at 00:00


Ondansetron HCl (Zofran Inj) 4 mg Q6H  PRN IV NAUSEA/VOMITING;  Start 3/29/19 at


00:00


Acetaminophen (Tylenol Tab) 650 mg Q6H  PRN PO .PAIN 1-3 OR TEMP;  Start 3/29/19


at 00:00


Heparin Sodium (Porcine) (Heparin  (5000 Units/1ml)) 5,000 unit Q12 SC  Last 


administered on 3/29/19at 08:39; Admin Dose 5,000 UNIT;  Start 3/29/19 at 09:00


Albuterol/ Ipratropium (Duoneb) 3 ml Q2H RESP THERAPY  PRN HHN SHORTNESS OF 


BREATH;  Start 3/29/19 at 00:00


Acetaminophen/ Hydrocodone Bitart (Norco (5/325)) 1 tab Q4  PRN PO pain 1-5;  


Start 3/29/19 at 00:00


Acetaminophen/ Hydrocodone Bitart (Norco (5/325)) 2 tab Q4H  PRN PO pain >5 Last


administered on 3/29/19at 00:19; Admin Dose 2 TAB;  Start 3/29/19 at 00:00


Miscellaneous Information (Pending Santyl Order For Wound Care) This patient 


ha... PRN  PRN XX WOUND CARE;  Start 3/29/19 at 05:30


Ceftriaxone Sodium 50 ml @  100 mls/hr DAILY IVPB  Last administered on 3/29/19


at 08:33; Admin Dose 100 MLS/HR;  Start 3/29/19 at 09:00


Vancomycin HCl (Vanco Iv Per Pharmacy) VANCOMYCIN PER PHARMACY PER PROTOCOL XX ;


 Start 3/29/19 at 09:00


Vancomycin HCl 2 gm/Sodium Chloride 500 ml @  125 mls/hr ONCE  ONCE IVPB ;  


Start 3/29/19 at 10:00;  Stop 3/29/19 at 13:59


Influenza Virus Vaccine Quadrival (Fluzone) 0.5 ml ONCE ONCE IM* ;  Start 


3/30/19 at 11:00;  Stop 3/30/19 at 11:01


Phytonadione 10 mg/Dextrose 51 ml @  102 mls/hr ONCE  ONCE IVPB ;  Start 3/29/19


at 12:00;  Stop 3/29/19 at 12:29;  Status CHRISTAL CASTRO                Mar 29, 2019 11:54

## 2019-03-29 NOTE — CONS
DATE OF ADMISSION: 03/28/2019

DATE OF CONSULTATION:  

 

 

 

REASON FOR CONSULTATION:  Evaluation for dialysis catheter placement.

 

HISTORY OF PRESENT ILLNESS:  This is a 58-year-old female with renal failure stage IV to V, was admit
erick because of worsening of the renal failure and anemia and will be needing acute dialysis.  Current
ly, the patient has a hemoglobin of 7 and a creatinine level of about 5.

 

PAST MEDICAL HISTORY:  Hypertension, hyperlipidemia, obesity, peripheral vascular disease, sleep apne
a

 

PAST SURGICAL HISTORY:  Above-knee amputation.

 

ALLERGIES:  NONE.

 

SOCIAL HISTORY:  No smoking, drinking or drug use.

 

MEDICATION LIST:  Reviewed.

 

PHYSICAL EXAMINATION:

GENERAL:  The patient is  intubated.

VITAL SIGNS:  Blood pressure is 110/60, pulse is 80, respirations 18.

CARDIOVASCULAR:  Normal S1 and S2.  Regular rate and rhythm.

LUNGS:  Clear.

ABDOMEN:  Soft.

EXTREMITIES:  Warm.  Amputation site noted.

 

LABORATORY VALUES:  Hemoglobin is now 8.6, white count 7, platelet count 63.  INR 1.7, PT 20.  Lactic
 acid 2.8.  Creatinine 5.25.

 

IMPRESSION:  Renal failure.

 

RECOMMENDATIONS:  We will proceed with placement of dialysis catheter.  I discussed with the patient'
s family.

 

 

Dictated By: JOSHUA YUAN MD

 

FM/NTS

DD:    03/29/2019 20:04:12

DT:    03/29/2019 20:40:28

Conf#: [id number]

DID#:  [id number]

CC: SRIDEVI YI MD; CHRISTAL REDDY MD;*End*

## 2019-03-29 NOTE — OPR
DATE OF OPERATION:  

 

 

PREOPERATIVE DIAGNOSIS:  Renal failure.

 

POSTOPERATIVE DIAGNOSIS:  Renal failure.

 

OPERATION PERFORMED:  Right femoral hemodialysis catheter placement.

 

SURGEON:  Joshua Castro MD

 

ANESTHESIA:  Local.

 

INFORMED CONSENT:  Risks, benefits, complications, alternative therapies were explained to the patien
t and the family.  Consent was obtained.

 

OPERATIVE TECHNIQUE:  The patient was placed in supine position, prepped and draped in usual sterile 
fashion.  Time-out was called.  Antibiotic was given.  Access was gained in the left common femoral a
rtery.  Guidewire was advanced through it without any difficulty.  Subcutaneous tissues were dilated.
  A 25 cm dialysis catheter was advanced over guidewire, secured to skin using silk sutures.  Both po
rts of the catheter were aspirated and injected using heparinized saline solution.  Appropriate dress
ings were applied.  The patient tolerated the procedure well.

 

 

Dictated By: JOSHUA CASTRO MD

 

FM/NTS

DD:    03/29/2019 20:12:37

DT:    03/29/2019 20:42:29

Conf#: [id number]

DID#:  [id number]

CC: SRIDEVI YI MD; CHRISTAL REDDY MD;*EndCC*

## 2019-03-29 NOTE — CONS
DATE OF ADMISSION: 03/28/2019

DATE OF CONSULTATION:  03/29/2019

 

 

 

TYPE OF CONSULTATION:  Nephrology.

 

REASON FOR CONSULTATION:  Acute kidney injury, chronic kidney disease.

 

PHYSICIAN REQUESTING CONSULT:  Dr. Mclain.

 

HISTORY OF PRESENT ILLNESS:  This is a 58-year-old female with a past medical history of chronic kidn
ey disease stage IV/V with previous creatinines around 4 mg/dL, history of morbid obesity, history of
 obstructive sleep apnea, history of peripheral vascular disease, history of heart failure.  A recent
 history of left AKA, history of multiple skin wounds, who presents to [facility]
mergency Room from [facility] due to worsening renal function and anemia.  The patient's history
 is obtained by reviewing medical records and speaking to hospital staff.  According to outside recor
ds, the patient had a recent admission to an outside hospital where she suffered acute kidney injury,
 necessitating left above-knee amputation.  The patient during those hospital records showed a creati
nine elevated around 4 to 5 mg/dL.  The patient's creatinine, however, stabilized and was discharged 
to a skilled nurse facility.  The patient now presents with worsening renal function.  Upon arrival i
n the emergency room, the patient was lethargic and obtunded.  The patient was started on IV fluids, 
antibiotic therapy and admitted to telemetry for further evaluation.

 

In terms of patient's renal history, the patient has underlying chronic kidney disease with a recent 
acute kidney injury.  Previous baseline creatinines were noted to be about 4 mg/dL.  There have been 
no reports of any hemoptysis, hematemesis, or hematochezia.  I left a message with the patient's elleni
ly; however, unable to get into contact with them to ascertain most recent renal function.

 

PAST MEDICAL HISTORY:  As stated above, history of chronic kidney disease, history of obesity, histor
y of obstructive sleep apnea, history of hypertension, history of peripheral vascular disease.

 

PAST SURGICAL HISTORY:  Status post above-knee amputation.

 

FAMILY HISTORY:  No family history of kidney disease.

 

SOCIAL HISTORY:  Lives at a skilled nursing facility.

 

ALLERGIES:  NONE.

 

MEDICATIONS:  Reviewed.

 

REVIEW OF SYSTEMS:  Unable to do adequate review of system as the patient is obtunded.  Pertinent pos
itives stated in HPI, otherwise negative.

 

PHYSICAL EXAMINATION:

VITAL SIGNS:  Blood pressure is 109/59, respirations 17, pulse 87, temperature 98.4.

GENERAL:  The patient is obese, lethargic.

HEENT:  Head is normocephalic.

NECK:  Supple.

HEART:  Regular rate.

LUNGS:  Show diminished breath sounds at the base.

ABDOMEN:  Soft, obese, nontender to palpation.

EXTREMITIES:  Negative for clubbing, cyanosis.  Positive edema.  Left AKA is noted.

NEUROLOGIC:  Limited exam.

DERMATOLOGIC:  No rashes.

MUSCULOSKELETAL:  No joint effusions.

 

LABORATORY DATA:  Shows sodium 141, potassium 4.5, chloride 100, , creatinine 5.02, lactic aci
d 2.7, total bilirubin 2.7.  White count 6.8, hemoglobin 7.0, platelet count 73.

 

IMAGING STUDIES:  The patient's imaging studies have been reviewed.

 

ASSESSMENT AND PLAN:  This is a 58-year-old female who presents with:

1.  Nonoliguric acute kidney injury on top of chronic kidney disease with a previous baseline creatin
ine of around 4.0 mg/dL.  Etiology of current acute kidney injury is possibly due to hemodynamics, se
psis, tubular injury.  The possibility of an acute glomerulonephritis or vasculitis is less likely.  
However, the patient's urinalysis does show significant pyuria and hematuria, bacteria, which is like
ly due to a urinary infection and less likely for an acute sediment.  Plan, however, would be to do a
 full evaluation.  We will repeat UA with microanalysis.  We will check urine electrolytes.  We will 
evaluate urine under microscopy.  We will obtain a renal ultrasound.  We will continue to try to obta
in most recent medical records and also contact patient's family to determine if they wish to pursue 
hemodialysis.  Otherwise, we will continue IV hydration.  Continue antibiotic therapy.  Continue supp
ortive care, renally dose all medicines.

2.  Anemia.  Continue to monitor hemoglobin and hematocrit levels.  Check iron panel.  We will give t
he patient Epogen.

3.  Mineral bone disorder, monitor calcium and phosphorus levels.

4.  Lactic acidosis, possibly due to sepsis.  Continue antibiotic therapy, IV fluids.

5.  Acute encephalopathy.  Etiology may be due to uremia, sepsis, toxic metabolic.  We will monitor m
ental status closely.  If mental status is not improved, we would consider initiating renal replaceme
nt therapy.

6.  History of obstructive sleep apnea.  Continue to monitor.

7.  Peripheral vascular disease status post left above knee amputation.

8.  History of hypertension.

9.  Morbid obesity.

10.  History of diastolic heart failure.  The patient appears decompensated.  Monitor closely and IV 
fluids.

 

Thank you, Dr. Mclain, for this interesting consult.  It will be a pleasure to follow the patient with 
you throughout the hospital course.

 

 

Dictated By: CLAY GIBBS DO

 

NR/NTS

DD:    03/29/2019 09:06:31

DT:    03/29/2019 10:05:05

Conf#: [id number]

DID#:  [id number]

CC: CHRISTAL REDDY MD; SRIDEVI MCLAIN MD;*EndCC*

## 2019-03-30 VITALS — DIASTOLIC BLOOD PRESSURE: 94 MMHG | RESPIRATION RATE: 17 BRPM | HEART RATE: 106 BPM | SYSTOLIC BLOOD PRESSURE: 106 MMHG

## 2019-03-30 VITALS — DIASTOLIC BLOOD PRESSURE: 47 MMHG | HEART RATE: 87 BPM | RESPIRATION RATE: 11 BRPM | SYSTOLIC BLOOD PRESSURE: 104 MMHG

## 2019-03-30 VITALS — HEART RATE: 105 BPM | SYSTOLIC BLOOD PRESSURE: 117 MMHG | DIASTOLIC BLOOD PRESSURE: 78 MMHG

## 2019-03-30 VITALS — HEART RATE: 91 BPM | DIASTOLIC BLOOD PRESSURE: 64 MMHG | SYSTOLIC BLOOD PRESSURE: 107 MMHG

## 2019-03-30 VITALS — SYSTOLIC BLOOD PRESSURE: 86 MMHG | HEART RATE: 102 BPM | DIASTOLIC BLOOD PRESSURE: 33 MMHG | RESPIRATION RATE: 15 BRPM

## 2019-03-30 VITALS — HEART RATE: 108 BPM | SYSTOLIC BLOOD PRESSURE: 105 MMHG | RESPIRATION RATE: 21 BRPM | DIASTOLIC BLOOD PRESSURE: 54 MMHG

## 2019-03-30 VITALS — DIASTOLIC BLOOD PRESSURE: 109 MMHG | RESPIRATION RATE: 18 BRPM | HEART RATE: 113 BPM | SYSTOLIC BLOOD PRESSURE: 135 MMHG

## 2019-03-30 VITALS — DIASTOLIC BLOOD PRESSURE: 57 MMHG | HEART RATE: 110 BPM | RESPIRATION RATE: 16 BRPM | SYSTOLIC BLOOD PRESSURE: 107 MMHG

## 2019-03-30 VITALS — DIASTOLIC BLOOD PRESSURE: 66 MMHG | HEART RATE: 106 BPM | SYSTOLIC BLOOD PRESSURE: 107 MMHG | RESPIRATION RATE: 15 BRPM

## 2019-03-30 VITALS — RESPIRATION RATE: 26 BRPM | SYSTOLIC BLOOD PRESSURE: 100 MMHG | HEART RATE: 107 BPM | DIASTOLIC BLOOD PRESSURE: 53 MMHG

## 2019-03-30 VITALS — HEART RATE: 103 BPM | DIASTOLIC BLOOD PRESSURE: 70 MMHG | RESPIRATION RATE: 15 BRPM | SYSTOLIC BLOOD PRESSURE: 115 MMHG

## 2019-03-30 VITALS — SYSTOLIC BLOOD PRESSURE: 92 MMHG | HEART RATE: 101 BPM | RESPIRATION RATE: 23 BRPM | DIASTOLIC BLOOD PRESSURE: 39 MMHG

## 2019-03-30 VITALS — DIASTOLIC BLOOD PRESSURE: 63 MMHG | RESPIRATION RATE: 17 BRPM | SYSTOLIC BLOOD PRESSURE: 107 MMHG | HEART RATE: 106 BPM

## 2019-03-30 VITALS — RESPIRATION RATE: 18 BRPM | DIASTOLIC BLOOD PRESSURE: 45 MMHG | SYSTOLIC BLOOD PRESSURE: 90 MMHG | HEART RATE: 106 BPM

## 2019-03-30 VITALS — RESPIRATION RATE: 12 BRPM | DIASTOLIC BLOOD PRESSURE: 51 MMHG | SYSTOLIC BLOOD PRESSURE: 139 MMHG | HEART RATE: 106 BPM

## 2019-03-30 VITALS — DIASTOLIC BLOOD PRESSURE: 46 MMHG | SYSTOLIC BLOOD PRESSURE: 104 MMHG | HEART RATE: 107 BPM | RESPIRATION RATE: 20 BRPM

## 2019-03-30 VITALS — DIASTOLIC BLOOD PRESSURE: 39 MMHG | HEART RATE: 89 BPM | SYSTOLIC BLOOD PRESSURE: 119 MMHG | RESPIRATION RATE: 13 BRPM

## 2019-03-30 VITALS — SYSTOLIC BLOOD PRESSURE: 112 MMHG | RESPIRATION RATE: 12 BRPM | HEART RATE: 104 BPM | DIASTOLIC BLOOD PRESSURE: 94 MMHG

## 2019-03-30 VITALS — DIASTOLIC BLOOD PRESSURE: 43 MMHG | SYSTOLIC BLOOD PRESSURE: 109 MMHG | RESPIRATION RATE: 16 BRPM | HEART RATE: 107 BPM

## 2019-03-30 VITALS — RESPIRATION RATE: 16 BRPM | DIASTOLIC BLOOD PRESSURE: 59 MMHG | SYSTOLIC BLOOD PRESSURE: 104 MMHG | HEART RATE: 110 BPM

## 2019-03-30 VITALS — RESPIRATION RATE: 13 BRPM | HEART RATE: 106 BPM | SYSTOLIC BLOOD PRESSURE: 105 MMHG | DIASTOLIC BLOOD PRESSURE: 76 MMHG

## 2019-03-30 VITALS — RESPIRATION RATE: 16 BRPM | DIASTOLIC BLOOD PRESSURE: 56 MMHG | HEART RATE: 109 BPM | SYSTOLIC BLOOD PRESSURE: 100 MMHG

## 2019-03-30 VITALS — SYSTOLIC BLOOD PRESSURE: 108 MMHG | RESPIRATION RATE: 13 BRPM | DIASTOLIC BLOOD PRESSURE: 58 MMHG | HEART RATE: 106 BPM

## 2019-03-30 VITALS — DIASTOLIC BLOOD PRESSURE: 76 MMHG | RESPIRATION RATE: 22 BRPM | HEART RATE: 108 BPM | SYSTOLIC BLOOD PRESSURE: 104 MMHG

## 2019-03-30 VITALS — SYSTOLIC BLOOD PRESSURE: 107 MMHG | HEART RATE: 106 BPM | RESPIRATION RATE: 13 BRPM | DIASTOLIC BLOOD PRESSURE: 49 MMHG

## 2019-03-30 VITALS — RESPIRATION RATE: 11 BRPM | HEART RATE: 94 BPM | SYSTOLIC BLOOD PRESSURE: 91 MMHG | DIASTOLIC BLOOD PRESSURE: 39 MMHG

## 2019-03-30 VITALS — SYSTOLIC BLOOD PRESSURE: 88 MMHG | HEART RATE: 104 BPM | DIASTOLIC BLOOD PRESSURE: 47 MMHG | RESPIRATION RATE: 17 BRPM

## 2019-03-30 VITALS — SYSTOLIC BLOOD PRESSURE: 106 MMHG | HEART RATE: 105 BPM | RESPIRATION RATE: 19 BRPM | DIASTOLIC BLOOD PRESSURE: 67 MMHG

## 2019-03-30 VITALS — DIASTOLIC BLOOD PRESSURE: 54 MMHG | RESPIRATION RATE: 20 BRPM | SYSTOLIC BLOOD PRESSURE: 99 MMHG | HEART RATE: 108 BPM

## 2019-03-30 VITALS — SYSTOLIC BLOOD PRESSURE: 105 MMHG | HEART RATE: 104 BPM | RESPIRATION RATE: 14 BRPM | DIASTOLIC BLOOD PRESSURE: 50 MMHG

## 2019-03-30 VITALS — HEART RATE: 104 BPM | DIASTOLIC BLOOD PRESSURE: 83 MMHG | SYSTOLIC BLOOD PRESSURE: 118 MMHG | RESPIRATION RATE: 12 BRPM

## 2019-03-30 VITALS — HEART RATE: 107 BPM | SYSTOLIC BLOOD PRESSURE: 95 MMHG | RESPIRATION RATE: 13 BRPM | DIASTOLIC BLOOD PRESSURE: 80 MMHG

## 2019-03-30 VITALS — SYSTOLIC BLOOD PRESSURE: 120 MMHG | DIASTOLIC BLOOD PRESSURE: 62 MMHG | RESPIRATION RATE: 15 BRPM | HEART RATE: 106 BPM

## 2019-03-30 VITALS — RESPIRATION RATE: 11 BRPM | HEART RATE: 89 BPM | DIASTOLIC BLOOD PRESSURE: 43 MMHG | SYSTOLIC BLOOD PRESSURE: 92 MMHG

## 2019-03-30 VITALS — SYSTOLIC BLOOD PRESSURE: 129 MMHG | DIASTOLIC BLOOD PRESSURE: 61 MMHG | HEART RATE: 103 BPM | RESPIRATION RATE: 14 BRPM

## 2019-03-30 VITALS — HEART RATE: 106 BPM | RESPIRATION RATE: 11 BRPM | SYSTOLIC BLOOD PRESSURE: 101 MMHG | DIASTOLIC BLOOD PRESSURE: 63 MMHG

## 2019-03-30 VITALS — RESPIRATION RATE: 14 BRPM | SYSTOLIC BLOOD PRESSURE: 88 MMHG | DIASTOLIC BLOOD PRESSURE: 43 MMHG | HEART RATE: 108 BPM

## 2019-03-30 VITALS — HEART RATE: 103 BPM | RESPIRATION RATE: 17 BRPM | DIASTOLIC BLOOD PRESSURE: 49 MMHG | SYSTOLIC BLOOD PRESSURE: 103 MMHG

## 2019-03-30 VITALS — HEART RATE: 108 BPM | DIASTOLIC BLOOD PRESSURE: 49 MMHG | RESPIRATION RATE: 30 BRPM | SYSTOLIC BLOOD PRESSURE: 107 MMHG

## 2019-03-30 VITALS — RESPIRATION RATE: 16 BRPM | DIASTOLIC BLOOD PRESSURE: 52 MMHG | SYSTOLIC BLOOD PRESSURE: 119 MMHG | HEART RATE: 109 BPM

## 2019-03-30 VITALS — HEART RATE: 103 BPM | DIASTOLIC BLOOD PRESSURE: 56 MMHG | SYSTOLIC BLOOD PRESSURE: 121 MMHG | RESPIRATION RATE: 16 BRPM

## 2019-03-30 VITALS — SYSTOLIC BLOOD PRESSURE: 114 MMHG | HEART RATE: 110 BPM | RESPIRATION RATE: 13 BRPM | DIASTOLIC BLOOD PRESSURE: 82 MMHG

## 2019-03-30 VITALS — RESPIRATION RATE: 13 BRPM | SYSTOLIC BLOOD PRESSURE: 125 MMHG | HEART RATE: 101 BPM | DIASTOLIC BLOOD PRESSURE: 45 MMHG

## 2019-03-30 VITALS — RESPIRATION RATE: 11 BRPM | DIASTOLIC BLOOD PRESSURE: 72 MMHG | HEART RATE: 104 BPM | SYSTOLIC BLOOD PRESSURE: 118 MMHG

## 2019-03-30 VITALS — HEART RATE: 99 BPM | RESPIRATION RATE: 10 BRPM | DIASTOLIC BLOOD PRESSURE: 57 MMHG | SYSTOLIC BLOOD PRESSURE: 110 MMHG

## 2019-03-30 VITALS — HEART RATE: 104 BPM | RESPIRATION RATE: 15 BRPM | DIASTOLIC BLOOD PRESSURE: 56 MMHG | SYSTOLIC BLOOD PRESSURE: 104 MMHG

## 2019-03-30 VITALS — DIASTOLIC BLOOD PRESSURE: 49 MMHG | HEART RATE: 102 BPM | SYSTOLIC BLOOD PRESSURE: 103 MMHG | RESPIRATION RATE: 11 BRPM

## 2019-03-30 VITALS — DIASTOLIC BLOOD PRESSURE: 71 MMHG | SYSTOLIC BLOOD PRESSURE: 110 MMHG | HEART RATE: 104 BPM | RESPIRATION RATE: 13 BRPM

## 2019-03-30 VITALS — SYSTOLIC BLOOD PRESSURE: 124 MMHG | DIASTOLIC BLOOD PRESSURE: 73 MMHG | RESPIRATION RATE: 18 BRPM | HEART RATE: 106 BPM

## 2019-03-30 VITALS — HEART RATE: 107 BPM | RESPIRATION RATE: 19 BRPM | DIASTOLIC BLOOD PRESSURE: 49 MMHG | SYSTOLIC BLOOD PRESSURE: 106 MMHG

## 2019-03-30 VITALS — SYSTOLIC BLOOD PRESSURE: 102 MMHG | HEART RATE: 110 BPM | DIASTOLIC BLOOD PRESSURE: 33 MMHG | RESPIRATION RATE: 27 BRPM

## 2019-03-30 VITALS — SYSTOLIC BLOOD PRESSURE: 141 MMHG | RESPIRATION RATE: 13 BRPM | DIASTOLIC BLOOD PRESSURE: 55 MMHG | HEART RATE: 105 BPM

## 2019-03-30 VITALS — SYSTOLIC BLOOD PRESSURE: 121 MMHG | DIASTOLIC BLOOD PRESSURE: 56 MMHG | HEART RATE: 104 BPM

## 2019-03-30 VITALS — DIASTOLIC BLOOD PRESSURE: 40 MMHG | HEART RATE: 102 BPM | SYSTOLIC BLOOD PRESSURE: 94 MMHG | RESPIRATION RATE: 14 BRPM

## 2019-03-30 VITALS — DIASTOLIC BLOOD PRESSURE: 41 MMHG | HEART RATE: 97 BPM | RESPIRATION RATE: 17 BRPM | SYSTOLIC BLOOD PRESSURE: 85 MMHG

## 2019-03-30 VITALS — SYSTOLIC BLOOD PRESSURE: 117 MMHG | RESPIRATION RATE: 16 BRPM | HEART RATE: 108 BPM | DIASTOLIC BLOOD PRESSURE: 51 MMHG

## 2019-03-30 VITALS — DIASTOLIC BLOOD PRESSURE: 56 MMHG | RESPIRATION RATE: 12 BRPM | HEART RATE: 104 BPM | SYSTOLIC BLOOD PRESSURE: 104 MMHG

## 2019-03-30 VITALS — RESPIRATION RATE: 13 BRPM | DIASTOLIC BLOOD PRESSURE: 45 MMHG | SYSTOLIC BLOOD PRESSURE: 87 MMHG | HEART RATE: 107 BPM

## 2019-03-30 VITALS — HEART RATE: 108 BPM | SYSTOLIC BLOOD PRESSURE: 90 MMHG | RESPIRATION RATE: 14 BRPM | DIASTOLIC BLOOD PRESSURE: 49 MMHG

## 2019-03-30 VITALS — SYSTOLIC BLOOD PRESSURE: 101 MMHG | HEART RATE: 107 BPM | RESPIRATION RATE: 22 BRPM | DIASTOLIC BLOOD PRESSURE: 43 MMHG

## 2019-03-30 VITALS — DIASTOLIC BLOOD PRESSURE: 36 MMHG | RESPIRATION RATE: 11 BRPM | HEART RATE: 105 BPM | SYSTOLIC BLOOD PRESSURE: 104 MMHG

## 2019-03-30 LAB
ABNORMAL IP MESSAGE: 1
ADD MAN DIFF?: YES
ADD UMIC: YES
ALANINE AMINOTRANSFERASE: 48 IU/L (ref 13–69)
ALBUMIN/GLOBULIN RATIO: 1
ALBUMIN: 2.5 G/DL (ref 3.3–4.9)
ALKALINE PHOSPHATASE: 151 IU/L (ref 42–121)
ALLEN TEST: (no result)
ANION GAP: 13 (ref 5–13)
ANISOCYTOSIS: (no result) (ref 0–0)
ARTERIAL BASE EXCESS: 0.1 MMOL/L (ref -3–3)
ARTERIAL BLOOD GAS OXYGEN SAT: 84.1 MMHG (ref 95–98)
ARTERIAL COHB: 0.1 % (ref 0–3)
ARTERIAL FRACTION OF OXYHGB: 83.7 % (ref 93–99)
ARTERIAL HCO3: 24.9 MMOL/L (ref 22–26)
ARTERIAL METHB: 0.4 % (ref 0–1.5)
ARTERIAL PCO2: 40.9 MMHG (ref 35–45)
ASPARTATE AMINO TRANSFERASE: 42 IU/L (ref 15–46)
BAND NEUTROPHILS #M: 0.5 10^3/UL (ref 0–0.6)
BAND NEUTROPHILS % (M): 15 % (ref 0–4)
BILIRUBIN,DIRECT: 1.1 MG/DL (ref 0–0.2)
BILIRUBIN,TOTAL: 2.8 MG/DL (ref 0.2–1.3)
BLOOD UREA NITROGEN: 124 MG/DL (ref 7–20)
BURR CELLS: (no result) (ref 0–0)
CALCIUM: 7.1 MG/DL (ref 8.4–10.2)
CARBON DIOXIDE: 23 MMOL/L (ref 21–31)
CHLORIDE: 106 MMOL/L (ref 97–110)
CREATININE,URINE RANDOM: 83.5 MG/DL (ref 20–320)
CREATININE: 4.79 MG/DL (ref 0.44–1)
EOSINOPHILS % (M): 3 % (ref 0–7)
ERYTHROBLAST% (NRBC) (M): 1 % (ref 0–0)
FIO2: 100 %
GLOBULIN: 2.5 G/DL (ref 1.3–3.2)
GLUCOSE: 60 MG/DL (ref 70–220)
HEMATOCRIT: 21.6 % (ref 37–47)
HEMOGLOBIN: 7 G/DL (ref 12–16)
IMMATURE GRANS #M: 0.04 10^3/UL (ref 0–0.03)
IMMATURE GRANS % (M): 1.1 % (ref 0–0.43)
LYMPHOCYTES #M: 0.8 10^3/UL (ref 0.8–2.9)
LYMPHOCYTES % (M): 24 % (ref 15–51)
MEAN CORPUSCULAR HEMOGLOBIN: 30.4 PG (ref 29–33)
MEAN CORPUSCULAR HGB CONC: 32.4 G/DL (ref 32–37)
MEAN CORPUSCULAR VOLUME: 93.9 FL (ref 82–101)
MEAN PLATELET VOLUME: 10.2 FL (ref 7.4–10.4)
METAMYELOCYTES #M: 0 10^3/UL (ref 0–0)
METAMYELOCYTES %M: 1 % (ref 0–0)
MICROCYTOSIS: (no result) (ref 0–0)
MODE: (no result)
MONOCYTE #M: 0 10^3/UL (ref 0.3–0.9)
MONOCYTES % (M): 2 % (ref 0–11)
MYELOCYTES #M: 0 10^3/UL (ref 0–0)
MYELOCYTES % (M): 1 % (ref 0–0)
NUCLEATED RED BLOOD CELLS%: 0.5 /100WBC (ref 0–0)
O2 A-A PPRESDIFF RESPIRATORY: 617.4 MMHG (ref 7–24)
OVALOCYTES: (no result) (ref 0–0)
PLATELET COUNT: 70 10^3/UL (ref 140–415)
PLATELET ESTIMATE: (no result)
POIKILOCYTOSIS: (no result) (ref 0–0)
POLYCHROMASIA: (no result) (ref 0–0)
POSITIVE DIFF: (no result)
POTASSIUM,URINE RANDOM: 58.6 MMOL/L (ref 25–125)
POTASSIUM: 4 MMOL/L (ref 3.5–5.1)
PROMYELOCYTES #M: 0 10^3/UL (ref 0–0)
PROMYELOCYTES % (M): 1 % (ref 0–0)
PROTEIN URINE: 29 MG/DL (ref 0–11.9)
REACTIVE LYMPHOCYTES #M: 0.1 10^3/UL (ref 0–0)
REACTIVE LYMPHOCYTES% (M): 3 % (ref 0–0)
RED BLOOD COUNT: 2.3 10^6/UL (ref 4.2–5.4)
RED CELL DISTRIBUTION WIDTH: 22.6 % (ref 11.5–14.5)
SEG NEUT #M: 1.9 10^3/UL (ref 1.6–7.5)
SEGMENTED NEUTROPHILS (M) %: 50 % (ref 39–77)
SMUDGE%M: 6 % (ref 0–0)
SODIUM,URINE RANDOM: < 13 MMOL/L (ref 30–90)
SODIUM: 142 MMOL/L (ref 135–144)
TARGET CELLS: (no result) (ref 0–0)
TOTAL PROTEIN: 5 G/DL (ref 6.1–8.1)
UR ASCORBIC ACID: NEGATIVE MG/DL
UR BACTERIA: (no result) /HPF
UR BILIRUBIN (DIP): NEGATIVE MG/DL
UR BLOOD (DIP): (no result) MG/DL
UR BUDDING YEAST: (no result) /HPF
UR CLARITY: (no result)
UR COLOR: (no result)
UR GLUCOSE (DIP): NEGATIVE MG/DL
UR KETONES (DIP): (no result) MG/DL
UR LEUKOCYTE ESTERASE (DIP): (no result) LEU/UL
UR MUCUS: (no result) /HPF
UR NITRITE (DIP): NEGATIVE MG/DL
UR PH (DIP): 5 (ref 5–9)
UR RBC: 53 /HPF (ref 0–5)
UR SPECIFIC GRAVITY (DIP): 1.01 (ref 1–1.03)
UR TOTAL PROTEIN (DIP): NEGATIVE MG/DL
UR UROBILINOGEN (DIP): NEGATIVE MG/DL
UR WBC: 88 /HPF (ref 0–5)
WHITE BLOOD COUNT: 3.7 10^3/UL (ref 4.8–10.8)

## 2019-03-30 RX ADMIN — LACTULOSE 1 GM: 20 SOLUTION ORAL at 13:15

## 2019-03-30 RX ADMIN — NYSTATIN 1 APPLIC: 100000 POWDER TOPICAL at 21:24

## 2019-03-30 RX ADMIN — PANTOPRAZOLE SODIUM 1 MG: 40 INJECTION, POWDER, FOR SOLUTION INTRAVENOUS at 05:20

## 2019-03-30 RX ADMIN — NYSTATIN 1 APPLIC: 100000 POWDER TOPICAL at 09:20

## 2019-03-30 RX ADMIN — ALBUMIN (HUMAN) SCH MLS/HR: 0.25 INJECTION, SOLUTION INTRAVENOUS at 05:18

## 2019-03-30 RX ADMIN — NYSTATIN SCH APPLIC: 100000 POWDER TOPICAL at 21:24

## 2019-03-30 RX ADMIN — THIAMINE HYDROCHLORIDE 1 MLS/HR: 100 INJECTION, SOLUTION INTRAMUSCULAR; INTRAVENOUS at 13:12

## 2019-03-30 RX ADMIN — LACTULOSE 1 GM: 20 SOLUTION ORAL at 21:23

## 2019-03-30 RX ADMIN — LACTULOSE SCH GM: 10 SOLUTION ORAL at 21:23

## 2019-03-30 RX ADMIN — COLLAGENASE SANTYL 1 APPLIC: 250 OINTMENT TOPICAL at 09:20

## 2019-03-30 RX ADMIN — ASCORBIC ACID, VITAMIN A PALMITATE, CHOLECALCIFEROL, THIAMINE HYDROCHLORIDE, RIBOFLAVIN-5 PHOSPHATE SODIUM, PYRIDOXINE HYDROCHLORIDE, NIACINAMIDE, DEXPANTHENOL, ALPHA-TOCOPHEROL ACETATE, VITAMIN K1, FOLIC ACID, BIOTIN, CYANOCOBALAMIN 1 MLS/HR: 200; 3300; 200; 6; 3.6; 6; 40; 15; 10; 150; 600; 60; 5 INJECTION, SOLUTION INTRAVENOUS at 21:24

## 2019-03-30 RX ADMIN — THIAMINE HYDROCHLORIDE 1 MLS/HR: 100 INJECTION, SOLUTION INTRAMUSCULAR; INTRAVENOUS at 09:19

## 2019-03-30 RX ADMIN — FOLIC ACID SCH MLS/HR: 5 INJECTION, SOLUTION INTRAMUSCULAR; INTRAVENOUS; SUBCUTANEOUS at 13:12

## 2019-03-30 RX ADMIN — LACTULOSE SCH GM: 10 SOLUTION ORAL at 13:15

## 2019-03-30 RX ADMIN — NYSTATIN SCH APPLIC: 100000 POWDER TOPICAL at 09:20

## 2019-03-30 RX ADMIN — CEFTRIAXONE SCH MLS/HR: 1 INJECTION, SOLUTION INTRAVENOUS at 09:20

## 2019-03-30 RX ADMIN — ALBUMIN (HUMAN) 1 MLS/HR: 0.25 INJECTION, SOLUTION INTRAVENOUS at 05:18

## 2019-03-30 RX ADMIN — FOLIC ACID SCH MLS/HR: 5 INJECTION, SOLUTION INTRAMUSCULAR; INTRAVENOUS; SUBCUTANEOUS at 09:19

## 2019-03-30 RX ADMIN — CEFTRIAXONE 1 MLS/HR: 1 INJECTION, SOLUTION INTRAVENOUS at 09:20

## 2019-03-30 RX ADMIN — IPRATROPIUM BROMIDE AND ALBUTEROL SULFATE 1 ML: .5; 3 SOLUTION RESPIRATORY (INHALATION) at 21:52

## 2019-03-30 NOTE — PN
Date/Time of Note


Date/Time of Note


DATE: 3/30/19 


TIME: 11:39





Assessment/Plan


Lines/Catheters


IV Catheter Type (from Nrsg):  PICC Line


Quintanilla in Place (from Nrsg):  Yes





Assessment/Plan


Assessment/Plan


Renal failure


SP post dialysis catheter placement


Status post dialysis


Continue hemodialysis per nephrology


He needs a permacath if she does not recover





Subjective


24 Hr Interval Summary


Constitutional:  improved


Pain Control:  mild





Exam/Review of Systems


Vital Signs


Vitals





Vital Signs


  Date      Temp  Pulse  Resp  B/P (MAP)   Pulse Ox  O2          O2 Flow    FiO2


Time                                                 Delivery    Rate


   3/30/19          110    27      102/33


     10:00                           (56)


   3/30/19                                      100


     09:00


   3/30/19                                           Nasal             2.0


     08:00                                           Cannula


   3/30/19  98.5


     08:00


   3/30/19                                                                    27


     04:57








Intake and Output





3/29/19


3/29/19


3/30/19





1515:00


23:00


07:00





IntakeIntake Total


50 ml


1324 ml


307 ml





OutputOutput Total


300 ml


555 ml


395 ml





BalanceBalance


-250 ml


769 ml


-88 ml














Exam


ENMT:  nl external ears & nose, nl lips & teeth, nl nasal mucosa & septum, 


mucosa pink and moist


Neck:  supple, non-tender


Respiratory:  clear to auscultation, normal air movement


Cardiovascular:  regular rate and rhythm, nl pulses


Gastrointestinal:  soft, nl liver, spleen, non-tender


Musculoskeletal:  nl extremities to inspection, nl gait and stance





Results


Result Diagram:  


3/30/19 0500                                                                    


           3/30/19 0500














JOSHUA YUAN MD           Mar 30, 2019 11:40

## 2019-03-30 NOTE — PN
Date/Time of Note


Date/Time of Note


DATE: 3/30/19 


TIME: 11:58





Assessment/Plan


VTE Prophylaxis


Risk score (from Ns)>0 risk:  8


SCD applied (from Ns):  Yes


Pharmacological prophylaxis:  NA/contraindicated


Pharm contraindication:  blood coag disorder





Assessment/Plan


Hospital Course


58-year-old female who was brought in from skilled nursing facility because of 


acute on chronic kidney disease.  Patient said to be a DO NOT RESUSCITATE DO NOT


INTUBATE.  Patient is completely obtunded at this time.  Patient with recent 


amputation for necrotizing fasciitis and is status post skin graft for right leg


ulcer





1.  Acute encephalopathy secondary to hepatic encephalopathy and/or uremia


Initially lactulose


Plan is for dialysis today


CT head showed no significant findings


   


2.   Acute on chronic kidney disease


Patient did have a sudden deterioration of renal function in the past several 


weeks, etiology may be secondary to worsening liver disease and/or hemodynamic


Plan is for initiation of dialysis today





3.  Liver cirrhosis likely secondary to KAMINSKI


Follow-up on hepatitis panel in a.m.


Abdominal ultrasound for further evaluation of the liver


Possible paracentesis if significant ascites is noted


Anasarca is noted





4.  Pancytopenia and coagulopathy secondary to liver disease


We will transfuse 1 unit with dialysis today


Monitor


Oral mucosal bleeding has been noted





5.  Lactic acidosis secondary to decreased clearance from cirrhosis


No evidence of sepsis but continue broad-spectrum antibiotics for now





6.  Hypotension likely secondary to liver disease


Cultures have been negative but will continue broad-spectrum antibiotics for 


now, consider de-escalation the next several days


Pressor support in anticipation of dialysis





7.   Status post recent skin graft of right leg ulcer with history of necroti


zing fasciitis





8.  History of left AKA





9.  Morbid obesity with OHS





10.  Reported history of secondary hyperparathyroidism


Nephrology following





11.  UTI with bacteria and yeast noted in urine


Continue fluconazole and antibiotics





Prophylaxis: SCDs





DC planning: Poor prognosis


Result Diagram:  


3/30/19 0500                                                                    


           3/30/19 0500





Results 24hrs





Laboratory Tests


Test
              3/29/19
14:34    3/29/19
14:45   3/29/19
16:59  3/30/19
01:30


Hepatitis B      NEGATIVE


Surface Antigen


Hepatitis B      NEGATIVE


Surface


Antibody


White Blood                                 7.0


Count


Red Blood Count                          2.82  #L


Hemoglobin                                8.6  #L


Hematocrit                                26.3  L


Mean                                       93.3


Corpuscular


Volume


Mean                                       30.5


Corpuscular


Hemoglobin


Mean             
                        32.7  
  
               



Corpuscular


Hemoglobin
Conc


ent


Red Cell                                  21.2  H


Distribution


Width


Platelet Count                              63  L           75  L


Mean Platelet                             10.7  H


Volume


Immature                                 1.900  H


Granulocytes %


Neutrophils %                             77.5  H


Segmented        
                          60  
  
               



Neutrophils


%
(Manual)


Band                                        12  H


Neutrophils %


(Manual)


Lymphocytes %                              15.0


Lymphocytes %                                19


(Manual)


Monocytes %                                 3.3


Monocytes %                                   3


(Manual)


Eosinophils %                               2.0


Eosinophils %                                 4


(Manual)


Basophils %                                 0.3


Metamyelocytes                               1  H


% (manual)


Nucleated Red                              0.6  H


Blood Cells %


Immature                                 0.130  H


Granulocytes #


Neutrophils #                               5.5


Neutrophils #                               4.3


(Manual)


Band                                       0.8  H


Neutrophils #


Lymphocytes                                 1.3


(Manual)


Lymphocytes #                               1.1


Monocytes #                                0.2  L


Monocytes #                                0.2  L


(Manual)


Eosinophils #                               0.1


Basophils #                                 0.0


Metamyelocytes                              0.0


#


Nucleated Red                               0.0


Blood Cells #


Platelet                          DECREASED


Estimate


Polychromasia                                3+


Poikilocytosis                               3+


Anisocytosis                                 2+


Macrocytosis                                 1+


Sodium Level                                140


Potassium Level                             4.4


Chloride Level                              101


Carbon Dioxide                               23


Level


Anion Gap                                   16  H


Blood Urea                                 128  H


Nitrogen


Creatinine                                5.25  H


Est Glomerular   
                          8  L
  
               



Filtrat


Rate
mL/min


Glucose Level                                82


Hemoglobin A1c                              4.9


Lactic Acid                               2.8  *H


Level


Calcium Level                              7.5  L


Magnesium Level                             2.2


Iron Level                                   74


Total Iron                                 144  L


Binding


Capacity


Percent Iron                                 51


Saturation


Ferritin                                 415.0  H


Total Bilirubin                            2.6  H


Direct                                    1.00  H


Bilirubin


Indirect                                   1.6  H


Bilirubin


Aspartate Amino  
                         51  H
  
               



Transf
(AST/SGO


T)


Alanine          
                          53  
  
               



Aminotransferas


e
(ALT/SGPT)


Alkaline                                   201  H


Phosphatase


Ammonia                                     92  H


Total Protein                              5.4  L


Albumin                                    2.4  L


Globulin                                   3.00


Albumin/Globuli                            0.80


n Ratio


Triglycerides                                92


Level


Cholesterol                                 95  L


Level


LDL                                          64


Cholesterol,


Calculated


HDL Cholesterol                             13  L


Cholesterol/HDL                             7.3


Ratio


Vitamin B12                       > 1000  H


Level


Folate                                     10.1


Thyroid          
                       2.290  
  
               



Stimulating


Hormone
(TSH)


Prothrombin                                               20.1  H


Time


Prothrombin                                                 1.6


Time Ratio


INR              
                
                       1.70  
  



International


Normalized
Rati


o


Activated        
                
                      45.1  H
  



Partial
Thrombo


plast Time


Thrombin Time                                              18.6


Fibrinogen                                                218.0


Plasma Fibrin    
                
                >10 and <40     



Degradation
Pro                                    H



ducts


D-Dimer                                                3371.23  H


D-Dimer Comment


Urine Random                                                             83.50


Creatinine


Urine Random                                                       < 13  L


Sodium


Urine Random                                                              58.6


Potassium


Urine Total                                                              29.0  H


Protein


Test
              3/30/19
04:55    3/30/19
05:00  
               



Lab Scanned      BLOOD
TRANSFUSI  
                
               



Report
          ON


White Blood                               3.7  #L


Count


Red Blood Count                           2.30  L


Hemoglobin                                 7.0  L


Hematocrit                                21.6  L


Mean                                       93.9


Corpuscular


Volume


Mean                                       30.4


Corpuscular


Hemoglobin


Mean             
                        32.4  
  
               



Corpuscular


Hemoglobin
Conc


ent


Red Cell                                  22.6  H


Distribution


Width


Platelet Count                              70  L


Mean Platelet                              10.2


Volume


Immature                                 1.100  H


Granulocytes %


Neutrophils %


Segmented        
                          50  
  
               



Neutrophils


%
(Manual)


Band                                        15  H


Neutrophils %


(Manual)


Lymphocytes %


Lymphocytes %                                24


(Manual)


Reactive         
                          3  H
  
               



Lymphocytes


%
(Manual)


Monocytes %


Monocytes %                                   2


(Manual)


Eosinophils %


Eosinophils %                                 3


(Manual)


Basophils %


Metamyelocytes                               1  H


% (manual)


Myelocytes %                                 1  H


(Manual)


Promyelocytes %                              1  H


(Manual)


Nucleated Red                                1  H


Blood Cells %


Immature                                 0.040  H


Granulocytes #


Neutrophils #


Neutrophils #                               1.9


(Manual)


Band                                        0.5


Neutrophils #


Lymphocytes                                 0.8


(Manual)


Lymphocytes #


Reactive                                   0.1  H


Lymphocytes #


Monocytes #


Monocytes #                                0.0  L


(Manual)


Eosinophils #


Basophils #


Metamyelocytes                              0.0


#


Myelocytes #                                0.0


Promyelocytes #                             0.0


Nucleated Red


Blood Cells #


Platelet                          SIG DECREASED


Estimate


Polychromasia                                3+


Poikilocytosis                               2+


Anisocytosis                                 2+


Microcytosis                                 1+


Target Cells                                 1+


Ovalocytes                                   1+


Sodium Level                                142


Potassium Level                             4.0


Chloride Level                              106


Carbon Dioxide                               23


Level


Anion Gap                                    13


Blood Urea                                 124  H


Nitrogen


Creatinine                                4.79  H


Est Glomerular   
                          9  L
  
               



Filtrat


Rate
mL/min


Glucose Level                              60  #L


Calcium Level                              7.1  L


Total Bilirubin                            2.8  H


Direct                                    1.10  H


Bilirubin


Indirect                                   1.7  H


Bilirubin


Aspartate Amino  
                          42  
  
               



Transf
(AST/SGO


T)


Alanine          
                          48  
  
               



Aminotransferas


e
(ALT/SGPT)


Alkaline                                   151  H


Phosphatase


Total Protein                              5.0  L


Albumin                                    2.5  L


Globulin                                   2.50


Albumin/Globuli                            1.00


n Ratio








Subjective


24 Hr Interval Summary


Constitutional:  disoriented





Exam/Review of Systems


Exam


Vitals





Vital Signs


  Date      Temp  Pulse  Resp  B/P (MAP)   Pulse Ox  O2          O2 Flow    FiO2


Time                                                 Delivery    Rate


   3/30/19          110    27      102/33


     10:00                           (56)


   3/30/19                                      100


     09:00


   3/30/19                                           Nasal             2.0


     08:00                                           Cannula


   3/30/19  98.5


     08:00


   3/30/19                                                                    27


     04:57








Intake and Output





3/29/19


3/29/19


3/30/19





1515:00


23:00


07:00





IntakeIntake Total


50 ml


1324 ml


307 ml





OutputOutput Total


300 ml


555 ml


395 ml





BalanceBalance


-250 ml


769 ml


-88 ml











Psych:  confusion


Respiratory:  clear to auscultation


Cardiovascular:  regular rate and rhythm


Gastrointestinal:  soft; 


   No distended


Musculoskeletal:  nl extremities to inspection





Results


Results 24hrs





Laboratory Tests


Test
              3/29/19
14:34    3/29/19
14:45   3/29/19
16:59  3/30/19
01:30


Hepatitis B      NEGATIVE


Surface Antigen


Hepatitis B      NEGATIVE


Surface


Antibody


White Blood                                 7.0


Count


Red Blood Count                          2.82  #L


Hemoglobin                                8.6  #L


Hematocrit                                26.3  L


Mean                                       93.3


Corpuscular


Volume


Mean                                       30.5


Corpuscular


Hemoglobin


Mean             
                        32.7  
  
               



Corpuscular


Hemoglobin
Conc


ent


Red Cell                                  21.2  H


Distribution


Width


Platelet Count                              63  L           75  L


Mean Platelet                             10.7  H


Volume


Immature                                 1.900  H


Granulocytes %


Neutrophils %                             77.5  H


Segmented        
                          60  
  
               



Neutrophils


%
(Manual)


Band                                        12  H


Neutrophils %


(Manual)


Lymphocytes %                              15.0


Lymphocytes %                                19


(Manual)


Monocytes %                                 3.3


Monocytes %                                   3


(Manual)


Eosinophils %                               2.0


Eosinophils %                                 4


(Manual)


Basophils %                                 0.3


Metamyelocytes                               1  H


% (manual)


Nucleated Red                              0.6  H


Blood Cells %


Immature                                 0.130  H


Granulocytes #


Neutrophils #                               5.5


Neutrophils #                               4.3


(Manual)


Band                                       0.8  H


Neutrophils #


Lymphocytes                                 1.3


(Manual)


Lymphocytes #                               1.1


Monocytes #                                0.2  L


Monocytes #                                0.2  L


(Manual)


Eosinophils #                               0.1


Basophils #                                 0.0


Metamyelocytes                              0.0


#


Nucleated Red                               0.0


Blood Cells #


Platelet                          DECREASED


Estimate


Polychromasia                                3+


Poikilocytosis                               3+


Anisocytosis                                 2+


Macrocytosis                                 1+


Sodium Level                                140


Potassium Level                             4.4


Chloride Level                              101


Carbon Dioxide                               23


Level


Anion Gap                                   16  H


Blood Urea                                 128  H


Nitrogen


Creatinine                                5.25  H


Est Glomerular   
                          8  L
  
               



Filtrat


Rate
mL/min


Glucose Level                                82


Hemoglobin A1c                              4.9


Lactic Acid                               2.8  *H


Level


Calcium Level                              7.5  L


Magnesium Level                             2.2


Iron Level                                   74


Total Iron                                 144  L


Binding


Capacity


Percent Iron                                 51


Saturation


Ferritin                                 415.0  H


Total Bilirubin                            2.6  H


Direct                                    1.00  H


Bilirubin


Indirect                                   1.6  H


Bilirubin


Aspartate Amino  
                         51  H
  
               



Transf
(AST/SGO


T)


Alanine          
                          53  
  
               



Aminotransferas


e
(ALT/SGPT)


Alkaline                                   201  H


Phosphatase


Ammonia                                     92  H


Total Protein                              5.4  L


Albumin                                    2.4  L


Globulin                                   3.00


Albumin/Globuli                            0.80


n Ratio


Triglycerides                                92


Level


Cholesterol                                 95  L


Level


LDL                                          64


Cholesterol,


Calculated


HDL Cholesterol                             13  L


Cholesterol/HDL                             7.3


Ratio


Vitamin B12                       > 1000  H


Level


Folate                                     10.1


Thyroid          
                       2.290  
  
               



Stimulating


Hormone
(TSH)


Prothrombin                                               20.1  H


Time


Prothrombin                                                 1.6


Time Ratio


INR              
                
                       1.70  
  



International


Normalized
Rati


o


Activated        
                
                      45.1  H
  



Partial
Thrombo


plast Time


Thrombin Time                                              18.6


Fibrinogen                                                218.0


Plasma Fibrin    
                
                >10 and <40     



Degradation
Pro                                    H



ducts


D-Dimer                                                3371.23  H


D-Dimer Comment


Urine Random                                                             83.50


Creatinine


Urine Random                                                       < 13  L


Sodium


Urine Random                                                              58.6


Potassium


Urine Total                                                              29.0  H


Protein


Test
              3/30/19
04:55    3/30/19
05:00  
               



Lab Scanned      BLOOD
TRANSFUSI  
                
               



Report
          ON


White Blood                               3.7  #L


Count


Red Blood Count                           2.30  L


Hemoglobin                                 7.0  L


Hematocrit                                21.6  L


Mean                                       93.9


Corpuscular


Volume


Mean                                       30.4


Corpuscular


Hemoglobin


Mean             
                        32.4  
  
               



Corpuscular


Hemoglobin
Conc


ent


Red Cell                                  22.6  H


Distribution


Width


Platelet Count                              70  L


Mean Platelet                              10.2


Volume


Immature                                 1.100  H


Granulocytes %


Neutrophils %


Segmented        
                          50  
  
               



Neutrophils


%
(Manual)


Band                                        15  H


Neutrophils %


(Manual)


Lymphocytes %


Lymphocytes %                                24


(Manual)


Reactive         
                          3  H
  
               



Lymphocytes


%
(Manual)


Monocytes %


Monocytes %                                   2


(Manual)


Eosinophils %


Eosinophils %                                 3


(Manual)


Basophils %


Metamyelocytes                               1  H


% (manual)


Myelocytes %                                 1  H


(Manual)


Promyelocytes %                              1  H


(Manual)


Nucleated Red                                1  H


Blood Cells %


Immature                                 0.040  H


Granulocytes #


Neutrophils #


Neutrophils #                               1.9


(Manual)


Band                                        0.5


Neutrophils #


Lymphocytes                                 0.8


(Manual)


Lymphocytes #


Reactive                                   0.1  H


Lymphocytes #


Monocytes #


Monocytes #                                0.0  L


(Manual)


Eosinophils #


Basophils #


Metamyelocytes                              0.0


#


Myelocytes #                                0.0


Promyelocytes #                             0.0


Nucleated Red


Blood Cells #


Platelet                          SIG DECREASED


Estimate


Polychromasia                                3+


Poikilocytosis                               2+


Anisocytosis                                 2+


Microcytosis                                 1+


Target Cells                                 1+


Ovalocytes                                   1+


Sodium Level                                142


Potassium Level                             4.0


Chloride Level                              106


Carbon Dioxide                               23


Level


Anion Gap                                    13


Blood Urea                                 124  H


Nitrogen


Creatinine                                4.79  H


Est Glomerular   
                          9  L
  
               



Filtrat


Rate
mL/min


Glucose Level                              60  #L


Calcium Level                              7.1  L


Total Bilirubin                            2.8  H


Direct                                    1.10  H


Bilirubin


Indirect                                   1.7  H


Bilirubin


Aspartate Amino  
                          42  
  
               



Transf
(AST/SGO


T)


Alanine          
                          48  
  
               



Aminotransferas


e
(ALT/SGPT)


Alkaline                                   151  H


Phosphatase


Total Protein                              5.0  L


Albumin                                    2.5  L


Globulin                                   2.50


Albumin/Globuli                            1.00


n Ratio








Medications


Medication





Current Medications


Sodium Chloride 1,000 ml @  75 mls/hr [id number] IV  Last administered on 3/30/19at


09:19; Admin Dose 75 MLS/HR;  Start 3/28/19 at 23:48


IV Flush (NS 3 ml) 3 ml PER PROTOCOL IV ;  Start 3/29/19 at 00:00


Ondansetron HCl (Zofran Inj) 4 mg Q6H  PRN IV NAUSEA/VOMITING;  Start 3/29/19 at


00:00


Acetaminophen (Tylenol Tab) 650 mg Q6H  PRN PO .PAIN 1-3 OR TEMP;  Start 3/29/19


at 00:00


Albuterol/ Ipratropium (Duoneb) 3 ml Q2H RESP THERAPY  PRN HHN SHORTNESS OF 


BREATH;  Start 3/29/19 at 00:00


Acetaminophen/ Hydrocodone Bitart (Norco (5/325)) 1 tab Q4  PRN PO pain 1-5;  S


tart 3/29/19 at 00:00


Acetaminophen/ Hydrocodone Bitart (Norco (5/325)) 2 tab Q4H  PRN PO pain >5 Last


administered on 3/29/19at 00:19; Admin Dose 2 TAB;  Start 3/29/19 at 00:00


Miscellaneous Information (Pending Santyl Order For Wound Care) This patient 


ha... PRN  PRN XX WOUND CARE;  Start 3/29/19 at 05:30


Ceftriaxone Sodium 50 ml @  100 mls/hr DAILY IVPB  Last administered on 


3/30/19at 09:20; Admin Dose 100 MLS/HR;  Start 3/29/19 at 09:00


Vancomycin HCl (Vanco Iv Per Pharmacy) VANCOMYCIN PER PHARMACY PER PROTOCOL XX ;


 Start 3/29/19 at 09:00


Pantoprazole (Protonix Iv) 40 mg DAILY@06 IV  Last administered on 3/30/19at 


05:20; Admin Dose 40 MG;  Start 3/30/19 at 06:00


Fluconazole/ Sodium Chloride 50 ml @ 50 mls/hr Q24H IVPB  Last administered on 


3/29/19at 14:57; Admin Dose 50 MLS/HR;  Start 3/29/19 at 13:30


Collagenase (Santyl) 1 applic DAILY TOP  Last administered on 3/30/19at 09:20; 


Admin Dose 1 APPLIC;  Start 3/30/19 at 09:00


Nystatin (Nystatin Powder) 1 applic BID TOP  Last administered on 3/30/19at 


09:20; Admin Dose 1 APPLIC;  Start 3/29/19 at 21:00


IV Flush (NS 10 ml) 10 ml PRN  PRN IV IV PROTOCOL;  Start 3/29/19 at 17:30


Norepinephrine 16 mg/Dextrose 250 ml @ 0 mls/hr TITRATE IV ;  Start 3/30/19 at 


08:00


Miscellaneous Information (*Rx Drug Level Order Reminder*) VANCO RANDOM W/ AM 


LABS... 0500  ONCE XX ;  Start 3/31/19 at 05:00;  Stop 3/31/19 at 05:01











NAZIA MCNEILL                  Mar 30, 2019 12:10

## 2019-03-30 NOTE — PN
Date/Time of Note


Date/Time of Note


DATE: 3/30/19 


TIME: 07:39





Assessment/Plan


VTE Prophylaxis


Risk score (from Ns)>0 risk:  6


SCD applied (from Ns):  No


SCD contraindicated:  other


Pharmacological prophylaxis:  other





Lines/Catheters


IV Catheter Type (from [facility]):  DANNIELLE CATH


Urinary Cath still in place:  Yes


Reason Cath still needed:  urinary retention





Assessment/Plan


Hospital Course


renal follow up 





remains critically ill in icu


has hypotension


d/w ICU nurse


HD cath was placed yesterday 


no fever, chills, new rash, tachypnea, melena 


 


MEDICATIONS:  Reviewed.


 


REVIEW OF SYSTEMS:  Unable to do adequate review of system as the patient is 


obtunded.  Pertinent positives stated in HPI, otherwise negative.


 


PHYSICAL EXAMINATION:


GENERAL:  The patient is obese, lethargic.


HEENT:  Head is normocephalic.


NECK:  Supple.


HEART:  Regular rate.


LUNGS:  Show diminished breath sounds at the base.


ABDOMEN:  Soft, obese, nontender to palpation.


EXTREMITIES:  Negative for clubbing, cyanosis.  Positive edema.  Left AKA is 


noted.


NEUROLOGIC:  Limited exam.


DERMATOLOGIC:  No rashes.


MUSCULOSKELETAL:  No joint effusions.


 


IMAGING STUDIES:  The patient's imaging studies have been reviewed.


 


ASSESSMENT AND PLAN:  This is a 58-year-old female who presents with:


1.  Nonoliguric acute kidney injury on top of chronic kidney disease with a 


previous baseline creatinine of around 4.0 mg/dL.  Etiology of current acute 


kidney injury is possibly due to hemodynamics, sepsis, tubular injury.  will 


need dialysis for volume and solute removal if her BP remains stable.  will 


start levophed drip to keep MAP > 60


2.  Anemia.  Continue to monitor hemoglobin and hematocrit levels.  Check iron 


panel.  We will give the patient Epogen.


3.  Mineral bone disorder, monitor calcium and phosphorus levels.


4.  Lactic acidosis, possibly due to sepsis.  Continue antibiotic therapy


5.  Acute encephalopathy.  Etiology may be due to uremia, sepsis, toxic metabol


ic.  We will monitor mental status closely.  If mental status is not improved, 


we would consider initiating renal replacement therapy.


6.  History of obstructive sleep apnea.  Continue to monitor.


7.  Peripheral vascular disease status post left above knee amputation.


8.  History of hypertension.


9.  Morbid obesity.


10.  History of diastolic heart failure.  The patient appears decompensated.  


will attempt to remove fluid with hd if bp tolerates


Result Diagram:  


3/30/19 0500                                                                    


           3/30/19 0500





Results 24hrs





Laboratory Tests


Test
              3/29/19
11:46    3/29/19
14:34  3/29/19
14:45   3/29/19
16:59


Blood Gas        Blood arterial
  
                
              



Specimen


Source



Arterial Blood   3/29/2019
12:25  
                
              



Date Drawn
               :02 PM


Arterial Blood         7.478  H
  
                
              



pH


(Temp
corrected


)


Arterial Blood          31.3  L
  
                
              



pCO2


(Temp
correct)


Arterial Blood          74.9  L
  
                
              



pO2


(Temp
corrected


)


Arterial Blood            22.7


HCO3


Arterial Blood            -0.4


Base Excess


Arterial Blood          93.6  L
  
                
              



Oxygen
Saturati


on


Brandon Test       ACCEPTAB


Arterial Blood   Right Radial  
  
                
              



Gas


Puncture
Site


Arterial                  0.1  
  
                
              



Blood
Carboxyhe


moglobin


Arterial Blood             0.5


Methemoglobin


Blood Gas A-a            37.4  H


O2 Differential


Oxyhemoglobin             93.0


Percent


Blood Gas                 37.0


Temperature


Blood Gas        ROOM AIR


Modality


FiO2                      21.0


Blood Gas        TM


Notified Whom


Blood Gas        3/29/2019
12:34  
                
              



Notified Time
            :07 PM


Hepatitis B                       NEGATIVE


Surface Antigen


Hepatitis B                       NEGATIVE


Surface


Antibody


White Blood                                                7.0


Count


Red Blood Count                                         2.82  #L


Hemoglobin                                               8.6  #L


Hematocrit                                               26.3  L


Mean                                                      93.3


Corpuscular


Volume


Mean                                                      30.5


Corpuscular


Hemoglobin


Mean             
                
                      32.7  
  



Corpuscular


Hemoglobin
Conc


ent


Red Cell                                                 21.2  H


Distribution


Width


Platelet Count                                             63  L           75  L


Mean Platelet                                            10.7  H


Volume


Immature                                                1.900  H


Granulocytes %


Neutrophils %                                            77.5  H


Segmented        
                
                        60  
  



Neutrophils


%
(Manual)


Band                                                       12  H


Neutrophils %


(Manual)


Lymphocytes %                                             15.0


Lymphocytes %                                               19


(Manual)


Monocytes %                                                3.3


Monocytes %                                                  3


(Manual)


Eosinophils %                                              2.0


Eosinophils %                                                4


(Manual)


Basophils %                                                0.3


Metamyelocytes                                              1  H


% (manual)


Nucleated Red                                             0.6  H


Blood Cells %


Immature                                                0.130  H


Granulocytes #


Neutrophils #                                              5.5


Neutrophils #                                              4.3


(Manual)


Band                                                      0.8  H


Neutrophils #


Lymphocytes                                                1.3


(Manual)


Lymphocytes #                                              1.1


Monocytes #                                               0.2  L


Monocytes #                                               0.2  L


(Manual)


Eosinophils #                                              0.1


Basophils #                                                0.0


Metamyelocytes                                             0.0


#


Nucleated Red                                              0.0


Blood Cells #


Platelet                                           DECREASED


Estimate


Polychromasia                                               3+


Poikilocytosis                                              3+


Anisocytosis                                                2+


Macrocytosis                                                1+


Sodium Level                                               140


Potassium Level                                            4.4


Chloride Level                                             101


Carbon Dioxide                                              23


Level


Anion Gap                                                  16  H


Blood Urea                                                128  H


Nitrogen


Creatinine                                               5.25  H


Est Glomerular   
                
                        8  L
  



Filtrat


Rate
mL/min


Glucose Level                                               82


Hemoglobin A1c                                             4.9


Lactic Acid                                              2.8  *H


Level


Calcium Level                                             7.5  L


Magnesium Level                                            2.2


Iron Level                                                  74


Total Iron                                                144  L


Binding


Capacity


Percent Iron                                                51


Saturation


Ferritin                                                415.0  H


Total Bilirubin                                           2.6  H


Direct                                                   1.00  H


Bilirubin


Indirect                                                  1.6  H


Bilirubin


Aspartate Amino  
                
                       51  H
  



Transf
(AST/SGO


T)


Alanine          
                
                        53  
  



Aminotransferas


e
(ALT/SGPT)


Alkaline                                                  201  H


Phosphatase


Ammonia                                                    92  H


Total Protein                                             5.4  L


Albumin                                                   2.4  L


Globulin                                                  3.00


Albumin/Globuli                                           0.80


n Ratio


Triglycerides                                               92


Level


Cholesterol                                                95  L


Level


LDL                                                         64


Cholesterol,


Calculated


HDL Cholesterol                                            13  L


Cholesterol/HDL                                            7.3


Ratio


Vitamin B12                                        > 1000  H


Level


Folate                                                    10.1


Thyroid          
                
                     2.290  
  



Stimulating


Hormone
(TSH)


Prothrombin                                                              20.1  H


Time


Prothrombin                                                                1.6


Time Ratio


INR              
                
                
                     1.70  



International


Normalized
Rati


o


Activated        
                
                
                    45.1  H



Partial
Thrombo


plast Time


Thrombin Time                                                             18.6


Fibrinogen                                                               218.0


Plasma Fibrin    
                
                
              >10 and <40


Degradation
Pro                                                   H



ducts


D-Dimer                                                               3371.23  H


D-Dimer Comment


Test
              3/30/19
01:30    3/30/19
04:55  3/30/19
05:00  



Urine Random             83.50


Creatinine


Urine Random     < 13  L


Sodium


Urine Random              58.6


Potassium


Urine Total              29.0  H


Protein


Lab Scanned      
                BLOOD
TRANSFUSI  
              



Report
                           ON


White Blood                                              3.7  #L


Count


Red Blood Count                                          2.30  L


Hemoglobin                                                7.0  L


Hematocrit                                               21.6  L


Mean                                                      93.9


Corpuscular


Volume


Mean                                                      30.4


Corpuscular


Hemoglobin


Mean             
                
                      32.4  
  



Corpuscular


Hemoglobin
Conc


ent


Red Cell                                                 22.6  H


Distribution


Width


Platelet Count                                             70  L


Mean Platelet                                             10.2


Volume


Immature                                                1.100  H


Granulocytes %


Neutrophils %


Lymphocytes %


Monocytes %


Eosinophils %


Basophils %


Nucleated Red                                             0.5  H


Blood Cells %


Immature                                                0.040  H


Granulocytes #


Neutrophils #


Lymphocytes #


Monocytes #


Eosinophils #


Basophils #


Nucleated Red


Blood Cells #


Sodium Level                                               142


Potassium Level                                            4.0


Chloride Level                                             106


Carbon Dioxide                                              23


Level


Anion Gap                                                   13


Blood Urea                                                124  H


Nitrogen


Creatinine                                               4.79  H


Est Glomerular   
                
                        9  L
  



Filtrat


Rate
mL/min


Glucose Level                                             60  #L


Calcium Level                                             7.1  L


Total Bilirubin                                           2.8  H


Direct                                                   1.10  H


Bilirubin


Indirect                                                  1.7  H


Bilirubin


Aspartate Amino  
                
                        42  
  



Transf
(AST/SGO


T)


Alanine          
                
                        48  
  



Aminotransferas


e
(ALT/SGPT)


Alkaline                                                  151  H


Phosphatase


Total Protein                                             5.0  L


Albumin                                                   2.5  L


Globulin                                                  2.50


Albumin/Globuli                                           1.00


n Ratio








Exam/Review of Systems


Exam


Vitals





Vital Signs


  Date      Temp  Pulse  Resp  B/P (MAP)   Pulse Ox  O2          O2 Flow    FiO2


Time                                                 Delivery    Rate


   3/30/19          101    23  92/39 (56)       100  Nasal


     06:00                                           Cannula


   3/30/19                                                             2.0    27


     04:57


   3/30/19  97.2


     04:00








Intake and Output





3/29/19


3/29/19


3/30/19





1515:00


23:00


07:00





IntakeIntake Total


50 ml


1324 ml


307 ml





OutputOutput Total


300 ml


555 ml


345 ml





BalanceBalance


-250 ml


769 ml


-38 ml














Results


Results 24hrs





Laboratory Tests


Test
              3/29/19
11:46    3/29/19
14:34  3/29/19
14:45   3/29/19
16:59


Blood Gas        Blood arterial
  
                
              



Specimen


Source



Arterial Blood   3/29/2019
12:25  
                
              



Date Drawn
               :02 PM


Arterial Blood         7.478  H
  
                
              



pH


(Temp
corrected


)


Arterial Blood          31.3  L
  
                
              



pCO2


(Temp
correct)


Arterial Blood          74.9  L
  
                
              



pO2


(Temp
corrected


)


Arterial Blood            22.7


HCO3


Arterial Blood            -0.4


Base Excess


Arterial Blood          93.6  L
  
                
              



Oxygen
Saturati


on


Brandon Test       ACCEPTAB


Arterial Blood   Right Radial  
  
                
              



Gas


Puncture
Site


Arterial                  0.1  
  
                
              



Blood
Carboxyhe


moglobin


Arterial Blood             0.5


Methemoglobin


Blood Gas A-a            37.4  H


O2 Differential


Oxyhemoglobin             93.0


Percent


Blood Gas                 37.0


Temperature


Blood Gas        ROOM AIR


Modality


FiO2                      21.0


Blood Gas        TM


Notified Whom


Blood Gas        3/29/2019
12:34  
                
              



Notified Time
            :07 PM


Hepatitis B                       NEGATIVE


Surface Antigen


Hepatitis B                       NEGATIVE


Surface


Antibody


White Blood                                                7.0


Count


Red Blood Count                                         2.82  #L


Hemoglobin                                               8.6  #L


Hematocrit                                               26.3  L


Mean                                                      93.3


Corpuscular


Volume


Mean                                                      30.5


Corpuscular


Hemoglobin


Mean             
                
                      32.7  
  



Corpuscular


Hemoglobin
Conc


ent


Red Cell                                                 21.2  H


Distribution


Width


Platelet Count                                             63  L           75  L


Mean Platelet                                            10.7  H


Volume


Immature                                                1.900  H


Granulocytes %


Neutrophils %                                            77.5  H


Segmented        
                
                        60  
  



Neutrophils


%
(Manual)


Band                                                       12  H


Neutrophils %


(Manual)


Lymphocytes %                                             15.0


Lymphocytes %                                               19


(Manual)


Monocytes %                                                3.3


Monocytes %                                                  3


(Manual)


Eosinophils %                                              2.0


Eosinophils %                                                4


(Manual)


Basophils %                                                0.3


Metamyelocytes                                              1  H


% (manual)


Nucleated Red                                             0.6  H


Blood Cells %


Immature                                                0.130  H


Granulocytes #


Neutrophils #                                              5.5


Neutrophils #                                              4.3


(Manual)


Band                                                      0.8  H


Neutrophils #


Lymphocytes                                                1.3


(Manual)


Lymphocytes #                                              1.1


Monocytes #                                               0.2  L


Monocytes #                                               0.2  L


(Manual)


Eosinophils #                                              0.1


Basophils #                                                0.0


Metamyelocytes                                             0.0


#


Nucleated Red                                              0.0


Blood Cells #


Platelet                                           DECREASED


Estimate


Polychromasia                                               3+


Poikilocytosis                                              3+


Anisocytosis                                                2+


Macrocytosis                                                1+


Sodium Level                                               140


Potassium Level                                            4.4


Chloride Level                                             101


Carbon Dioxide                                              23


Level


Anion Gap                                                  16  H


Blood Urea                                                128  H


Nitrogen


Creatinine                                               5.25  H


Est Glomerular   
                
                        8  L
  



Filtrat


Rate
mL/min


Glucose Level                                               82


Hemoglobin A1c                                             4.9


Lactic Acid                                              2.8  *H


Level


Calcium Level                                             7.5  L


Magnesium Level                                            2.2


Iron Level                                                  74


Total Iron                                                144  L


Binding


Capacity


Percent Iron                                                51


Saturation


Ferritin                                                415.0  H


Total Bilirubin                                           2.6  H


Direct                                                   1.00  H


Bilirubin


Indirect                                                  1.6  H


Bilirubin


Aspartate Amino  
                
                       51  H
  



Transf
(AST/SGO


T)


Alanine          
                
                        53  
  



Aminotransferas


e
(ALT/SGPT)


Alkaline                                                  201  H


Phosphatase


Ammonia                                                    92  H


Total Protein                                             5.4  L


Albumin                                                   2.4  L


Globulin                                                  3.00


Albumin/Globuli                                           0.80


n Ratio


Triglycerides                                               92


Level


Cholesterol                                                95  L


Level


LDL                                                         64


Cholesterol,


Calculated


HDL Cholesterol                                            13  L


Cholesterol/HDL                                            7.3


Ratio


Vitamin B12                                        > 1000  H


Level


Folate                                                    10.1


Thyroid          
                
                     2.290  
  



Stimulating


Hormone
(TSH)


Prothrombin                                                              20.1  H


Time


Prothrombin                                                                1.6


Time Ratio


INR              
                
                
                     1.70  



International


Normalized
Rati


o


Activated        
                
                
                    45.1  H



Partial
Thrombo


plast Time


Thrombin Time                                                             18.6


Fibrinogen                                                               218.0


Plasma Fibrin    
                
                
              >10 and <40


Degradation
Pro                                                   H



ducts


D-Dimer                                                               3371.23  H


D-Dimer Comment


Test
              3/30/19
01:30    3/30/19
04:55  3/30/19
05:00  



Urine Random             83.50


Creatinine


Urine Random     < 13  L


Sodium


Urine Random              58.6


Potassium


Urine Total              29.0  H


Protein


Lab Scanned      
                BLOOD
TRANSFUSI  
              



Report
                           ON


White Blood                                              3.7  #L


Count


Red Blood Count                                          2.30  L


Hemoglobin                                                7.0  L


Hematocrit                                               21.6  L


Mean                                                      93.9


Corpuscular


Volume


Mean                                                      30.4


Corpuscular


Hemoglobin


Mean             
                
                      32.4  
  



Corpuscular


Hemoglobin
Conc


ent


Red Cell                                                 22.6  H


Distribution


Width


Platelet Count                                             70  L


Mean Platelet                                             10.2


Volume


Immature                                                1.100  H


Granulocytes %


Neutrophils %


Lymphocytes %


Monocytes %


Eosinophils %


Basophils %


Nucleated Red                                             0.5  H


Blood Cells %


Immature                                                0.040  H


Granulocytes #


Neutrophils #


Lymphocytes #


Monocytes #


Eosinophils #


Basophils #


Nucleated Red


Blood Cells #


Sodium Level                                               142


Potassium Level                                            4.0


Chloride Level                                             106


Carbon Dioxide                                              23


Level


Anion Gap                                                   13


Blood Urea                                                124  H


Nitrogen


Creatinine                                               4.79  H


Est Glomerular   
                
                        9  L
  



Filtrat


Rate
mL/min


Glucose Level                                             60  #L


Calcium Level                                             7.1  L


Total Bilirubin                                           2.8  H


Direct                                                   1.10  H


Bilirubin


Indirect                                                  1.7  H


Bilirubin


Aspartate Amino  
                
                        42  
  



Transf
(AST/SGO


T)


Alanine          
                
                        48  
  



Aminotransferas


e
(ALT/SGPT)


Alkaline                                                  151  H


Phosphatase


Total Protein                                             5.0  L


Albumin                                                   2.5  L


Globulin                                                  2.50


Albumin/Globuli                                           1.00


n Ratio








Medications


Medication





Current Medications


Sodium Chloride 1,000 ml @  75 mls/hr [id number] IV ;  Start 3/28/19 at 23:48


IV Flush (NS 3 ml) 3 ml PER PROTOCOL IV ;  Start 3/29/19 at 00:00


Ondansetron HCl (Zofran Inj) 4 mg Q6H  PRN IV NAUSEA/VOMITING;  Start 3/29/19 at


00:00


Acetaminophen (Tylenol Tab) 650 mg Q6H  PRN PO .PAIN 1-3 OR TEMP;  Start 3/29/19


at 00:00


Albuterol/ Ipratropium (Duoneb) 3 ml Q2H RESP THERAPY  PRN HHN SHORTNESS OF 


BREATH;  Start 3/29/19 at 00:00


Acetaminophen/ Hydrocodone Bitart (Norco (5/325)) 1 tab Q4  PRN PO pain 1-5;  


Start 3/29/19 at 00:00


Acetaminophen/ Hydrocodone Bitart (Norco (5/325)) 2 tab Q4H  PRN PO pain >5 Last


administered on 3/29/19at 00:19; Admin Dose 2 TAB;  Start 3/29/19 at 00:00


Miscellaneous Information (Pending Santyl Order For Wound Care) This patient 


ha... PRN  PRN XX WOUND CARE;  Start 3/29/19 at 05:30


Ceftriaxone Sodium 50 ml @  100 mls/hr DAILY IVPB  Last administered on 3/29/


19at 08:33; Admin Dose 100 MLS/HR;  Start 3/29/19 at 09:00


Vancomycin HCl (Vanco Iv Per Pharmacy) VANCOMYCIN PER PHARMACY PER PROTOCOL XX ;


 Start 3/29/19 at 09:00


Influenza Virus Vaccine Quadrival (Fluzone) 0.5 ml ONCE ONCE IM* ;  Start 


3/30/19 at 11:00;  Stop 3/30/19 at 11:01


Pantoprazole (Protonix Iv) 40 mg DAILY@06 IV  Last administered on 3/30/19at 


05:20; Admin Dose 40 MG;  Start 3/30/19 at 06:00


Fluconazole/ Sodium Chloride 50 ml @ 50 mls/hr Q24H IVPB  Last administered on 


3/29/19at 14:57; Admin Dose 50 MLS/HR;  Start 3/29/19 at 13:30


Collagenase (Santyl) 1 applic DAILY TOP ;  Start 3/30/19 at 09:00


Nystatin (Nystatin Powder) 1 applic BID TOP  Last administered on 3/29/19at 


22:05; Admin Dose 1 APPLIC;  Start 3/29/19 at 21:00


IV Flush (NS 10 ml) 10 ml PRN  PRN IV IV PROTOCOL;  Start 3/29/19 at 17:30











TRES DE LOS SANTOS DO              Mar 30, 2019 07:43

## 2019-03-31 VITALS — HEART RATE: 111 BPM | RESPIRATION RATE: 20 BRPM | SYSTOLIC BLOOD PRESSURE: 67 MMHG | DIASTOLIC BLOOD PRESSURE: 32 MMHG

## 2019-03-31 VITALS — DIASTOLIC BLOOD PRESSURE: 48 MMHG | RESPIRATION RATE: 19 BRPM | HEART RATE: 118 BPM | SYSTOLIC BLOOD PRESSURE: 103 MMHG

## 2019-03-31 VITALS — DIASTOLIC BLOOD PRESSURE: 43 MMHG | SYSTOLIC BLOOD PRESSURE: 93 MMHG | RESPIRATION RATE: 18 BRPM | HEART RATE: 112 BPM

## 2019-03-31 VITALS — RESPIRATION RATE: 20 BRPM | DIASTOLIC BLOOD PRESSURE: 50 MMHG | SYSTOLIC BLOOD PRESSURE: 99 MMHG | HEART RATE: 112 BPM

## 2019-03-31 VITALS — SYSTOLIC BLOOD PRESSURE: 89 MMHG | RESPIRATION RATE: 21 BRPM | HEART RATE: 114 BPM | DIASTOLIC BLOOD PRESSURE: 33 MMHG

## 2019-03-31 VITALS — SYSTOLIC BLOOD PRESSURE: 92 MMHG | RESPIRATION RATE: 18 BRPM | DIASTOLIC BLOOD PRESSURE: 80 MMHG | HEART RATE: 113 BPM

## 2019-03-31 VITALS — RESPIRATION RATE: 7 BRPM

## 2019-03-31 VITALS — HEART RATE: 34 BPM | SYSTOLIC BLOOD PRESSURE: 48 MMHG | DIASTOLIC BLOOD PRESSURE: 12 MMHG

## 2019-03-31 VITALS — HEART RATE: 110 BPM

## 2019-03-31 VITALS — HEART RATE: 28 BPM

## 2019-03-31 VITALS — DIASTOLIC BLOOD PRESSURE: 33 MMHG | HEART RATE: 113 BPM | RESPIRATION RATE: 22 BRPM | SYSTOLIC BLOOD PRESSURE: 73 MMHG

## 2019-03-31 VITALS — DIASTOLIC BLOOD PRESSURE: 60 MMHG | RESPIRATION RATE: 19 BRPM | SYSTOLIC BLOOD PRESSURE: 114 MMHG | HEART RATE: 113 BPM

## 2019-03-31 VITALS — HEART RATE: 49 BPM

## 2019-03-31 VITALS — HEART RATE: 20 BPM

## 2019-03-31 VITALS — HEART RATE: 112 BPM | RESPIRATION RATE: 21 BRPM | DIASTOLIC BLOOD PRESSURE: 52 MMHG | SYSTOLIC BLOOD PRESSURE: 95 MMHG

## 2019-03-31 VITALS — HEART RATE: 67 BPM

## 2019-03-31 VITALS — HEART RATE: 34 BPM

## 2019-03-31 LAB
ALLEN TEST: (no result)
ARTERIAL BASE EXCESS: -1.9 MMOL/L (ref -3–3)
ARTERIAL BLOOD GAS OXYGEN SAT: 53 MMHG (ref 95–98)
ARTERIAL COHB: 0.3 % (ref 0–3)
ARTERIAL FRACTION OF OXYHGB: 52.5 % (ref 93–99)
ARTERIAL HCO3: 25.1 MMOL/L (ref 22–26)
ARTERIAL METHB: 0.6 % (ref 0–1.5)
ARTERIAL PCO2: 53.5 MMHG (ref 35–45)
FIO2: 100 %
MODE: (no result)
O2 A-A PPRESDIFF RESPIRATORY: 625.3 MMHG (ref 7–24)

## 2019-03-31 PROCEDURE — 5A1D70Z PERFORMANCE OF URINARY FILTRATION, INTERMITTENT, LESS THAN 6 HOURS PER DAY: ICD-10-PCS

## 2019-03-31 PROCEDURE — 5A1D70Z PERFORMANCE OF URINARY FILTRATION, INTERMITTENT, LESS THAN 6 HOURS PER DAY: ICD-10-PCS | Performed by: INTERNAL MEDICINE

## 2019-03-31 RX ADMIN — EPINEPHRINE 1 MG: 0.1 INJECTION INTRACARDIAC; INTRAVENOUS at 03:16

## 2019-03-31 RX ADMIN — ALBUMIN (HUMAN) 1 MLS/HR: 0.25 INJECTION, SOLUTION INTRAVENOUS at 00:00

## 2019-03-31 RX ADMIN — SODIUM CHLORIDE 1 MLS/HR: 234 INJECTION INTRAMUSCULAR; INTRAVENOUS; SUBCUTANEOUS at 00:04

## 2019-03-31 NOTE — DES
Date/Time of Note


Date/Time of Note


DATE: 3/31/19 


TIME: 19:24





Discharge/Death Summary


Admission/Discharge Info


Admit Date/Time


Mar 28, 2019 at 23:13


Death Date/Time


March 31, 2019


Final Diagnosis


Cardiopulmonary failure secondary to multiple comorbidities, end-stage renal 


disease, cirrhosis, sepsis


Preliminary Cause of Death


Cardiopulmonary failure secondary to multiple comorbidities, end-stage renal 


disease, cirrhosis, sepsis


Hospital Course


Patient is a 58-year-old female who was brought in from skilled nursing facility


because of acute on chronic kidney disease.  Patient said to be a DO NOT 


RESUSCITATE DO NOT INTUBATE.   Patient with recent amputation for necrotizing 


fasciitis and is status post skin graft for right leg ulcer.  Patient presents 


with acute encephalopathy likely secondary to hepatic encephalopathy and her 


uremia with worsening renal function.  Patient was noted to have cirrhosis and 


ultrasound of the abdomen was started on lactulose, patient was seen by 


nephrology and dialysis was initiated.  Patient was placed on broad-spectrum 


antibiotics, lactic acid was elevated possible secondary to decreased clearance 


versus sepsis.  Patient decompensated overnight and was pronounced at  0237 on 


3/31/2019











NAZIA MCNEILL                  Mar 31, 2019 19:27

## 2019-04-01 LAB
MICROALBUMIN/CREATININE RATIO: 82 (ref ?–30)
MICROALBUMIN: 6.9 MG/DL